# Patient Record
Sex: MALE | Race: WHITE | Employment: FULL TIME | URBAN - METROPOLITAN AREA
[De-identification: names, ages, dates, MRNs, and addresses within clinical notes are randomized per-mention and may not be internally consistent; named-entity substitution may affect disease eponyms.]

---

## 2020-07-30 ENCOUNTER — APPOINTMENT (OUTPATIENT)
Dept: CT IMAGING | Age: 45
End: 2020-07-30
Payer: COMMERCIAL

## 2020-07-30 ENCOUNTER — HOSPITAL ENCOUNTER (EMERGENCY)
Age: 45
Discharge: HOME | End: 2020-07-30
Payer: COMMERCIAL

## 2020-07-30 VITALS
TEMPERATURE: 98.8 F | BODY MASS INDEX: 34.06 KG/M2 | OXYGEN SATURATION: 99 % | RESPIRATION RATE: 16 BRPM | HEIGHT: 67 IN | DIASTOLIC BLOOD PRESSURE: 95 MMHG | WEIGHT: 217 LBS | SYSTOLIC BLOOD PRESSURE: 157 MMHG | HEART RATE: 86 BPM

## 2020-07-30 DIAGNOSIS — K57.32 DIVERTICULITIS OF COLON: Primary | ICD-10-CM

## 2020-07-30 LAB
ALBUMIN SERPL-MCNC: 4.3 G/DL (ref 3.5–5.2)
ALP BLD-CCNC: 80 U/L (ref 40–129)
ALT SERPL-CCNC: 49 U/L (ref 0–40)
ANION GAP SERPL CALCULATED.3IONS-SCNC: 11 MMOL/L (ref 7–16)
AST SERPL-CCNC: 23 U/L (ref 0–39)
BASOPHILS ABSOLUTE: 0.02 E9/L (ref 0–0.2)
BASOPHILS RELATIVE PERCENT: 0.2 % (ref 0–2)
BILIRUB SERPL-MCNC: 0.6 MG/DL (ref 0–1.2)
BUN BLDV-MCNC: 15 MG/DL (ref 6–20)
CALCIUM SERPL-MCNC: 9.3 MG/DL (ref 8.6–10.2)
CHLORIDE BLD-SCNC: 96 MMOL/L (ref 98–107)
CO2: 30 MMOL/L (ref 22–29)
CREAT SERPL-MCNC: 1.2 MG/DL (ref 0.7–1.2)
EOSINOPHILS ABSOLUTE: 0.23 E9/L (ref 0.05–0.5)
EOSINOPHILS RELATIVE PERCENT: 1.9 % (ref 0–6)
GFR AFRICAN AMERICAN: >60
GFR NON-AFRICAN AMERICAN: >60 ML/MIN/1.73
GLUCOSE BLD-MCNC: 87 MG/DL (ref 74–99)
HCT VFR BLD CALC: 46.2 % (ref 37–54)
HEMOGLOBIN: 16.5 G/DL (ref 12.5–16.5)
IMMATURE GRANULOCYTES #: 0.09 E9/L
IMMATURE GRANULOCYTES %: 0.7 % (ref 0–5)
LACTIC ACID: 1.9 MMOL/L (ref 0.5–2.2)
LIPASE: 22 U/L (ref 13–60)
LYMPHOCYTES ABSOLUTE: 1.57 E9/L (ref 1.5–4)
LYMPHOCYTES RELATIVE PERCENT: 12.7 % (ref 20–42)
MCH RBC QN AUTO: 29.9 PG (ref 26–35)
MCHC RBC AUTO-ENTMCNC: 35.7 % (ref 32–34.5)
MCV RBC AUTO: 83.7 FL (ref 80–99.9)
MONOCYTES ABSOLUTE: 1.3 E9/L (ref 0.1–0.95)
MONOCYTES RELATIVE PERCENT: 10.5 % (ref 2–12)
NEUTROPHILS ABSOLUTE: 9.14 E9/L (ref 1.8–7.3)
NEUTROPHILS RELATIVE PERCENT: 74 % (ref 43–80)
PDW BLD-RTO: 11.9 FL (ref 11.5–15)
PLATELET # BLD: 209 E9/L (ref 130–450)
PMV BLD AUTO: 9.5 FL (ref 7–12)
POTASSIUM SERPL-SCNC: 4 MMOL/L (ref 3.5–5)
RBC # BLD: 5.52 E12/L (ref 3.8–5.8)
SODIUM BLD-SCNC: 137 MMOL/L (ref 132–146)
TOTAL PROTEIN: 7.4 G/DL (ref 6.4–8.3)
WBC # BLD: 12.4 E9/L (ref 4.5–11.5)

## 2020-07-30 PROCEDURE — 96375 TX/PRO/DX INJ NEW DRUG ADDON: CPT

## 2020-07-30 PROCEDURE — 2500000003 HC RX 250 WO HCPCS: Performed by: PHYSICIAN ASSISTANT

## 2020-07-30 PROCEDURE — 80053 COMPREHEN METABOLIC PANEL: CPT

## 2020-07-30 PROCEDURE — 99284 EMERGENCY DEPT VISIT MOD MDM: CPT

## 2020-07-30 PROCEDURE — 85025 COMPLETE CBC W/AUTO DIFF WBC: CPT

## 2020-07-30 PROCEDURE — 2580000003 HC RX 258: Performed by: PHYSICIAN ASSISTANT

## 2020-07-30 PROCEDURE — 6360000004 HC RX CONTRAST MEDICATION: Performed by: RADIOLOGY

## 2020-07-30 PROCEDURE — 83605 ASSAY OF LACTIC ACID: CPT

## 2020-07-30 PROCEDURE — 6360000002 HC RX W HCPCS: Performed by: PHYSICIAN ASSISTANT

## 2020-07-30 PROCEDURE — 83690 ASSAY OF LIPASE: CPT

## 2020-07-30 PROCEDURE — 6370000000 HC RX 637 (ALT 250 FOR IP): Performed by: PHYSICIAN ASSISTANT

## 2020-07-30 PROCEDURE — 96365 THER/PROPH/DIAG IV INF INIT: CPT

## 2020-07-30 PROCEDURE — 74177 CT ABD & PELVIS W/CONTRAST: CPT

## 2020-07-30 RX ORDER — DICYCLOMINE HYDROCHLORIDE 10 MG/1
10 CAPSULE ORAL ONCE
Status: COMPLETED | OUTPATIENT
Start: 2020-07-30 | End: 2020-07-30

## 2020-07-30 RX ORDER — ENALAPRIL MALEATE 10 MG/1
10 TABLET ORAL 2 TIMES DAILY
COMMUNITY
End: 2021-09-13

## 2020-07-30 RX ORDER — METRONIDAZOLE 500 MG/1
500 TABLET ORAL 2 TIMES DAILY
Qty: 14 TABLET | Refills: 0 | Status: SHIPPED | OUTPATIENT
Start: 2020-07-30 | End: 2020-08-06

## 2020-07-30 RX ORDER — DIPHENHYDRAMINE HYDROCHLORIDE 50 MG/ML
25 INJECTION INTRAMUSCULAR; INTRAVENOUS ONCE
Status: COMPLETED | OUTPATIENT
Start: 2020-07-30 | End: 2020-07-30

## 2020-07-30 RX ORDER — HYDROMORPHONE HYDROCHLORIDE 1 MG/ML
0.5 INJECTION, SOLUTION INTRAMUSCULAR; INTRAVENOUS; SUBCUTANEOUS ONCE
Status: COMPLETED | OUTPATIENT
Start: 2020-07-30 | End: 2020-07-30

## 2020-07-30 RX ORDER — DIPHENHYDRAMINE HYDROCHLORIDE 50 MG/ML
25 INJECTION INTRAMUSCULAR; INTRAVENOUS ONCE
Status: DISCONTINUED | OUTPATIENT
Start: 2020-07-30 | End: 2020-07-30 | Stop reason: HOSPADM

## 2020-07-30 RX ORDER — CEFDINIR 300 MG/1
300 CAPSULE ORAL ONCE
Status: COMPLETED | OUTPATIENT
Start: 2020-07-30 | End: 2020-07-30

## 2020-07-30 RX ORDER — HYDROXYZINE HYDROCHLORIDE 25 MG/1
25 TABLET, FILM COATED ORAL 3 TIMES DAILY PRN
COMMUNITY

## 2020-07-30 RX ORDER — 0.9 % SODIUM CHLORIDE 0.9 %
1000 INTRAVENOUS SOLUTION INTRAVENOUS ONCE
Status: COMPLETED | OUTPATIENT
Start: 2020-07-30 | End: 2020-07-30

## 2020-07-30 RX ORDER — HYDROCHLOROTHIAZIDE 25 MG/1
25 TABLET ORAL DAILY
COMMUNITY

## 2020-07-30 RX ORDER — ONDANSETRON 4 MG/1
4 TABLET, ORALLY DISINTEGRATING ORAL EVERY 8 HOURS PRN
Qty: 24 TABLET | Refills: 0 | Status: SHIPPED | OUTPATIENT
Start: 2020-07-30 | End: 2021-08-25

## 2020-07-30 RX ORDER — KETOROLAC TROMETHAMINE 15 MG/ML
15 INJECTION, SOLUTION INTRAMUSCULAR; INTRAVENOUS ONCE
Status: COMPLETED | OUTPATIENT
Start: 2020-07-30 | End: 2020-07-30

## 2020-07-30 RX ORDER — CEFDINIR 300 MG/1
300 CAPSULE ORAL 2 TIMES DAILY
Qty: 14 CAPSULE | Refills: 0 | Status: SHIPPED | OUTPATIENT
Start: 2020-07-30 | End: 2020-08-06

## 2020-07-30 RX ORDER — DICYCLOMINE HYDROCHLORIDE 10 MG/1
20 CAPSULE ORAL
Qty: 15 CAPSULE | Refills: 0 | Status: SHIPPED | OUTPATIENT
Start: 2020-07-30 | End: 2021-08-25

## 2020-07-30 RX ADMIN — CEFDINIR 300 MG: 300 CAPSULE ORAL at 18:08

## 2020-07-30 RX ADMIN — DIPHENHYDRAMINE HYDROCHLORIDE 25 MG: 50 INJECTION, SOLUTION INTRAMUSCULAR; INTRAVENOUS at 16:10

## 2020-07-30 RX ADMIN — KETOROLAC TROMETHAMINE 15 MG: 15 INJECTION, SOLUTION INTRAMUSCULAR; INTRAVENOUS at 15:15

## 2020-07-30 RX ADMIN — IOPAMIDOL 75 ML: 755 INJECTION, SOLUTION INTRAVENOUS at 16:25

## 2020-07-30 RX ADMIN — DICYCLOMINE HYDROCHLORIDE 10 MG: 10 CAPSULE ORAL at 19:23

## 2020-07-30 RX ADMIN — SODIUM CHLORIDE 1000 ML: 9 INJECTION, SOLUTION INTRAVENOUS at 15:15

## 2020-07-30 RX ADMIN — IOHEXOL 50 ML: 240 INJECTION, SOLUTION INTRATHECAL; INTRAVASCULAR; INTRAVENOUS; ORAL at 16:25

## 2020-07-30 RX ADMIN — METRONIDAZOLE 500 MG: 500 INJECTION, SOLUTION INTRAVENOUS at 18:08

## 2020-07-30 RX ADMIN — HYDROMORPHONE HYDROCHLORIDE 0.5 MG: 1 INJECTION, SOLUTION INTRAMUSCULAR; INTRAVENOUS; SUBCUTANEOUS at 18:08

## 2020-07-30 RX ADMIN — HYDROMORPHONE HYDROCHLORIDE 0.5 MG: 1 INJECTION, SOLUTION INTRAMUSCULAR; INTRAVENOUS; SUBCUTANEOUS at 16:10

## 2020-07-30 SDOH — HEALTH STABILITY: MENTAL HEALTH: HOW OFTEN DO YOU HAVE A DRINK CONTAINING ALCOHOL?: NEVER

## 2020-07-30 ASSESSMENT — PAIN DESCRIPTION - PAIN TYPE: TYPE: ACUTE PAIN

## 2020-07-30 ASSESSMENT — PAIN SCALES - GENERAL
PAINLEVEL_OUTOF10: 6
PAINLEVEL_OUTOF10: 7
PAINLEVEL_OUTOF10: 6

## 2020-07-30 ASSESSMENT — PAIN DESCRIPTION - ORIENTATION: ORIENTATION: LEFT;LOWER

## 2020-07-30 ASSESSMENT — PAIN DESCRIPTION - LOCATION: LOCATION: ABDOMEN;FLANK

## 2020-07-30 NOTE — ED PROVIDER NOTES
[facility]     Department of Emergency Medicine   ED  Provider Note  Admit Date/RoomTime: 7/30/2020  2:50 PM  ED Room: 24/24  Chief Complaint     Abdominal Pain (LT SIDED FLANK AREA  X1 DAY/ HX DIVERTICULITIS)    History of Present Illness   Source of history provided by:  patient. History/Exam Limitations: none. Adarsh Hernandes is a 45 y.o. old male who has a past medical history of:   Past Medical History:   Diagnosis Date    Diverticulitis     Hypertension       presents to the emergency department by private vehicle, for complaints of gradual onset, still present, constant stabbing pain in the LLQ without radiation which began 1 day(s) prior to arrival.  There has been similar episodes in the past due to diverticulitis. Since onset the symptoms have been persistent and worsening. The pain is associated with nothing pertinent. The pain is aggravated by pressing on his abdomen and relieved by nothing. There has been NO back pain, chills, cloudy urine, constipation, diarrhea, dysuria, headache, hematuria, penile discharge, sweating, urinary frequency, urinary incontinence, urinary urgency or vomiting. ROS   Pertinent positives and negatives are stated within HPI, all other systems reviewed and are negative. Past Surgical History:   Procedure Laterality Date    COLON SURGERY       Social History:  reports that he has never smoked. He does not have any smokeless tobacco history on file. He reports that he does not drink alcohol or use drugs. Family History: family history is not on file. Allergies: Patient has no known allergies.     Physical Exam         ED Triage Vitals   BP Temp Temp Source Pulse Resp SpO2 Height Weight   07/30/20 1445 07/30/20 1440 07/30/20 1440 07/30/20 1445 07/30/20 1445 07/30/20 1445 07/30/20 1445 07/30/20 1445   128/89 98.8 °F (37.1 °C) Temporal 95 16 97 % 5' 7\" (1.702 m) 217 lb (98.4 kg)      Oxygen Saturation Interpretation: Normal.    · General Appearance/Constitutional:  Alert, development consistent with age. · HEENT:  NC/NT. Airway patent. · Neck:  Supple. No lymphadenopathy. · Respiratory: Lungs Clear to auscultation and breath sounds equal.  · CV:  Regular rate and rhythm. · GI:  General Appearance: normal.         Bowel sounds: normal bowel sounds. Distension:  Moderate. Tenderness: severe tenderness is present in the LLQ, no rebound tenderness, no guarding. Liver: non-tender. Spleen:  non-tender. Pulsatile Mass: absent. · Back: CVA Tenderness: No.  · Integument:  Normal turgor. Warm, dry, without visible rash, unless noted elsewhere. · Neurological:  Orientation age-appropriate. Motor functions intact.     Lab / Imaging Results   (All laboratory and radiology results have been personally reviewed by myself)  Labs:  Results for orders placed or performed during the hospital encounter of 07/30/20   Comprehensive Metabolic Panel   Result Value Ref Range    Sodium 137 132 - 146 mmol/L    Potassium 4.0 3.5 - 5.0 mmol/L    Chloride 96 (L) 98 - 107 mmol/L    CO2 30 (H) 22 - 29 mmol/L    Anion Gap 11 7 - 16 mmol/L    Glucose 87 74 - 99 mg/dL    BUN 15 6 - 20 mg/dL    CREATININE 1.2 0.7 - 1.2 mg/dL    GFR Non-African American >60 >=60 mL/min/1.73    GFR African American >60     Calcium 9.3 8.6 - 10.2 mg/dL    Total Protein 7.4 6.4 - 8.3 g/dL    Alb 4.3 3.5 - 5.2 g/dL    Total Bilirubin 0.6 0.0 - 1.2 mg/dL    Alkaline Phosphatase 80 40 - 129 U/L    ALT 49 (H) 0 - 40 U/L    AST 23 0 - 39 U/L   Lipase   Result Value Ref Range    Lipase 22 13 - 60 U/L   Lactic Acid, Plasma   Result Value Ref Range    Lactic Acid 1.9 0.5 - 2.2 mmol/L   CBC Auto Differential   Result Value Ref Range    WBC 12.4 (H) 4.5 - 11.5 E9/L    RBC 5.52 3.80 - 5.80 E12/L    Hemoglobin 16.5 12.5 - 16.5 g/dL    Hematocrit 46.2 37.0 - 54.0 %    MCV 83.7 80.0 - 99.9 fL    MCH 29.9 26.0 - 35.0 pg    MCHC 35.7 (H) 32.0 - 34.5 %    RDW 11.9 11.5 - 15.0 fL    Platelets 929 075 - 902 E9/L    MPV 9.5 7.0 - 12.0 fL    Neutrophils % 74.0 43.0 - 80.0 %    Immature Granulocytes % 0.7 0.0 - 5.0 %    Lymphocytes % 12.7 (L) 20.0 - 42.0 %    Monocytes % 10.5 2.0 - 12.0 %    Eosinophils % 1.9 0.0 - 6.0 %    Basophils % 0.2 0.0 - 2.0 %    Neutrophils Absolute 9.14 (H) 1.80 - 7.30 E9/L    Immature Granulocytes # 0.09 E9/L    Lymphocytes Absolute 1.57 1.50 - 4.00 E9/L    Monocytes Absolute 1.30 (H) 0.10 - 0.95 E9/L    Eosinophils Absolute 0.23 0.05 - 0.50 E9/L    Basophils Absolute 0.02 0.00 - 0.20 E9/L     Imaging: All Radiology results interpreted by Radiologist unless otherwise noted. CT ABDOMEN PELVIS W IV CONTRAST Additional Contrast? Oral   Preliminary Result   1. Acute uncomplicated diverticulitis involving the proximal sigmoid colon. No evidence of perforation or abscess. Previous sigmoid colectomy of the   distal sigmoid colon. The anastomosis is unremarkable. 2. No other acute findings within the abdomen or pelvis. 3. Mild hepatic steatosis. ED Course / Medical Decision Making     Medications   cefdinir (OMNICEF) capsule 300 mg (has no administration in time range)   metronidazole (FLAGYL) 500 mg in NaCl 100 mL IVPB premix (has no administration in time range)   0.9 % sodium chloride bolus (0 mLs Intravenous Stopped 7/30/20 1605)   ketorolac (TORADOL) injection 15 mg (15 mg Intravenous Given 7/30/20 1515)   HYDROmorphone HCl PF (DILAUDID) injection 0.5 mg (0.5 mg Intravenous Given 7/30/20 1610)   diphenhydrAMINE (BENADRYL) injection 25 mg (25 mg Intravenous Given 7/30/20 1610)   iopamidol (ISOVUE-370) 76 % injection 75 mL (75 mLs Intravenous Given 7/30/20 1625)   iohexol (OMNIPAQUE 240) injection 50 mL (50 mLs Oral Given 7/30/20 1625)        Re-Evaluations:  7/30/20   Time: 1600  Patient states that his symptoms were improving, but his pain is starting to worsen again.        Time: 1745    Patients symptoms are improving. Consultations:             None    Procedures:   none    MDM: Patient presents to the emergency department for left lower quadrant pain. Diagnostic work-up confirms uncomplicated diverticulitis. He had improvement of his symptoms with the medications above. He remained well-appearing throughout his emergency department stay. He will be given the prescriptions below and should follow-up with Lon Harris.  Specific conditions for emergent return have been discussed and the patient verbalized understanding to return immediately for new or worsening symptoms. Prior to discharge, patient was requesting 1 more dose of pain medication before he left. Patient states that his wife is going to pick him up and drive him home. Counseling:   I have spoken with the patient and discussed todays results, in addition to providing specific details for the plan of care and counseling regarding the diagnosis and prognosis and are agreeable with the plan. Assessment      1. Diverticulitis of colon      This patient's ED course included: a personal history and physicial examination, re-evaluation prior to disposition and IV medications  This patient has remained hemodynamically stable, improved and been closely monitored during their ED course. Plan   Discharge to home. Patient condition is good. New Medications     New Prescriptions    CEFDINIR (OMNICEF) 300 MG CAPSULE    Take 1 capsule by mouth 2 times daily for 7 days    DICYCLOMINE (BENTYL) 10 MG CAPSULE    Take 2 capsules by mouth 4 times daily (before meals and nightly)    METRONIDAZOLE (FLAGYL) 500 MG TABLET    Take 1 tablet by mouth 2 times daily for 7 days    ONDANSETRON (ZOFRAN ODT) 4 MG DISINTEGRATING TABLET    Take 1 tablet by mouth every 8 hours as needed for Nausea or Vomiting     Electronically signed by Misty Hale PA-C   DD: 7/30/20  **This report was transcribed using voice recognition software.  Every effort was made to ensure

## 2021-08-24 ENCOUNTER — APPOINTMENT (OUTPATIENT)
Dept: GENERAL RADIOLOGY | Age: 46
End: 2021-08-24
Payer: COMMERCIAL

## 2021-08-24 ENCOUNTER — APPOINTMENT (OUTPATIENT)
Dept: CT IMAGING | Age: 46
End: 2021-08-24
Payer: COMMERCIAL

## 2021-08-24 ENCOUNTER — HOSPITAL ENCOUNTER (EMERGENCY)
Age: 46
Discharge: ANOTHER HOSPITAL | End: 2021-08-24
Attending: EMERGENCY MEDICINE
Payer: COMMERCIAL

## 2021-08-24 ENCOUNTER — HOSPITAL ENCOUNTER (OUTPATIENT)
Age: 46
Setting detail: OBSERVATION
Discharge: HOME | End: 2021-08-26
Attending: EMERGENCY MEDICINE | Admitting: ORTHOPAEDIC SURGERY
Payer: COMMERCIAL

## 2021-08-24 VITALS
HEIGHT: 67 IN | TEMPERATURE: 97.7 F | HEART RATE: 91 BPM | RESPIRATION RATE: 20 BRPM | DIASTOLIC BLOOD PRESSURE: 73 MMHG | OXYGEN SATURATION: 98 % | WEIGHT: 237 LBS | SYSTOLIC BLOOD PRESSURE: 164 MMHG | BODY MASS INDEX: 37.2 KG/M2

## 2021-08-24 DIAGNOSIS — S93.324A LISFRANC'S DISLOCATION, RIGHT, INITIAL ENCOUNTER: Primary | ICD-10-CM

## 2021-08-24 DIAGNOSIS — S93.324A LISFRANC DISLOCATION, RIGHT, INITIAL ENCOUNTER: Primary | ICD-10-CM

## 2021-08-24 LAB
ANION GAP SERPL CALCULATED.3IONS-SCNC: 15 MMOL/L (ref 7–16)
BASOPHILS ABSOLUTE: 0.03 E9/L (ref 0–0.2)
BASOPHILS RELATIVE PERCENT: 0.3 % (ref 0–2)
BUN BLDV-MCNC: 16 MG/DL (ref 6–20)
CALCIUM SERPL-MCNC: 9.5 MG/DL (ref 8.6–10.2)
CHLORIDE BLD-SCNC: 98 MMOL/L (ref 98–107)
CO2: 26 MMOL/L (ref 22–29)
CREAT SERPL-MCNC: 1.1 MG/DL (ref 0.7–1.2)
EOSINOPHILS ABSOLUTE: 0.16 E9/L (ref 0.05–0.5)
EOSINOPHILS RELATIVE PERCENT: 1.6 % (ref 0–6)
GFR AFRICAN AMERICAN: >60
GFR NON-AFRICAN AMERICAN: >60 ML/MIN/1.73
GLUCOSE BLD-MCNC: 144 MG/DL (ref 74–99)
HCT VFR BLD CALC: 44.5 % (ref 37–54)
HEMOGLOBIN: 16.3 G/DL (ref 12.5–16.5)
IMMATURE GRANULOCYTES #: 0.04 E9/L
IMMATURE GRANULOCYTES %: 0.4 % (ref 0–5)
LYMPHOCYTES ABSOLUTE: 2.81 E9/L (ref 1.5–4)
LYMPHOCYTES RELATIVE PERCENT: 27.5 % (ref 20–42)
MAGNESIUM: 2.1 MG/DL (ref 1.6–2.6)
MCH RBC QN AUTO: 30.6 PG (ref 26–35)
MCHC RBC AUTO-ENTMCNC: 36.6 % (ref 32–34.5)
MCV RBC AUTO: 83.6 FL (ref 80–99.9)
MONOCYTES ABSOLUTE: 0.76 E9/L (ref 0.1–0.95)
MONOCYTES RELATIVE PERCENT: 7.5 % (ref 2–12)
NEUTROPHILS ABSOLUTE: 6.4 E9/L (ref 1.8–7.3)
NEUTROPHILS RELATIVE PERCENT: 62.7 % (ref 43–80)
PDW BLD-RTO: 12.4 FL (ref 11.5–15)
PLATELET # BLD: 265 E9/L (ref 130–450)
PMV BLD AUTO: 10 FL (ref 7–12)
POTASSIUM REFLEX MAGNESIUM: 3.4 MMOL/L (ref 3.5–5)
RBC # BLD: 5.32 E12/L (ref 3.8–5.8)
SODIUM BLD-SCNC: 139 MMOL/L (ref 132–146)
WBC # BLD: 10.2 E9/L (ref 4.5–11.5)

## 2021-08-24 PROCEDURE — 6360000002 HC RX W HCPCS: Performed by: STUDENT IN AN ORGANIZED HEALTH CARE EDUCATION/TRAINING PROGRAM

## 2021-08-24 PROCEDURE — 83735 ASSAY OF MAGNESIUM: CPT

## 2021-08-24 PROCEDURE — 85025 COMPLETE CBC W/AUTO DIFF WBC: CPT

## 2021-08-24 PROCEDURE — 73560 X-RAY EXAM OF KNEE 1 OR 2: CPT

## 2021-08-24 PROCEDURE — 73630 X-RAY EXAM OF FOOT: CPT

## 2021-08-24 PROCEDURE — 96376 TX/PRO/DX INJ SAME DRUG ADON: CPT

## 2021-08-24 PROCEDURE — 6360000002 HC RX W HCPCS: Performed by: PHYSICAL THERAPY ASSISTANT

## 2021-08-24 PROCEDURE — 96374 THER/PROPH/DIAG INJ IV PUSH: CPT

## 2021-08-24 PROCEDURE — 73700 CT LOWER EXTREMITY W/O DYE: CPT

## 2021-08-24 PROCEDURE — 36415 COLL VENOUS BLD VENIPUNCTURE: CPT

## 2021-08-24 PROCEDURE — 80048 BASIC METABOLIC PNL TOTAL CA: CPT

## 2021-08-24 PROCEDURE — 99284 EMERGENCY DEPT VISIT MOD MDM: CPT

## 2021-08-24 PROCEDURE — 96375 TX/PRO/DX INJ NEW DRUG ADDON: CPT

## 2021-08-24 PROCEDURE — 99283 EMERGENCY DEPT VISIT LOW MDM: CPT

## 2021-08-24 PROCEDURE — 6360000002 HC RX W HCPCS

## 2021-08-24 RX ORDER — HYDROMORPHONE HYDROCHLORIDE 1 MG/ML
1 INJECTION, SOLUTION INTRAMUSCULAR; INTRAVENOUS; SUBCUTANEOUS ONCE
Status: COMPLETED | OUTPATIENT
Start: 2021-08-24 | End: 2021-08-24

## 2021-08-24 RX ORDER — FENTANYL CITRATE 50 UG/ML
INJECTION, SOLUTION INTRAMUSCULAR; INTRAVENOUS
Status: COMPLETED
Start: 2021-08-24 | End: 2021-08-24

## 2021-08-24 RX ORDER — MIDAZOLAM HYDROCHLORIDE 2 MG/2ML
1 INJECTION, SOLUTION INTRAMUSCULAR; INTRAVENOUS ONCE
Status: DISCONTINUED | OUTPATIENT
Start: 2021-08-24 | End: 2021-08-24

## 2021-08-24 RX ORDER — HYDROMORPHONE HYDROCHLORIDE 1 MG/ML
0.5 INJECTION, SOLUTION INTRAMUSCULAR; INTRAVENOUS; SUBCUTANEOUS ONCE
Status: DISCONTINUED | OUTPATIENT
Start: 2021-08-24 | End: 2021-08-24

## 2021-08-24 RX ORDER — FENTANYL CITRATE 50 UG/ML
75 INJECTION, SOLUTION INTRAMUSCULAR; INTRAVENOUS ONCE
Status: COMPLETED | OUTPATIENT
Start: 2021-08-24 | End: 2021-08-24

## 2021-08-24 RX ORDER — FENTANYL CITRATE 50 UG/ML
50 INJECTION, SOLUTION INTRAMUSCULAR; INTRAVENOUS ONCE
Status: COMPLETED | OUTPATIENT
Start: 2021-08-24 | End: 2021-08-24

## 2021-08-24 RX ORDER — ONDANSETRON 2 MG/ML
4 INJECTION INTRAMUSCULAR; INTRAVENOUS EVERY 6 HOURS PRN
Status: DISCONTINUED | OUTPATIENT
Start: 2021-08-24 | End: 2021-08-26 | Stop reason: HOSPADM

## 2021-08-24 RX ADMIN — HYDROMORPHONE HYDROCHLORIDE 1 MG: 1 INJECTION, SOLUTION INTRAMUSCULAR; INTRAVENOUS; SUBCUTANEOUS at 22:21

## 2021-08-24 RX ADMIN — HYDROMORPHONE HYDROCHLORIDE 1 MG: 1 INJECTION, SOLUTION INTRAMUSCULAR; INTRAVENOUS; SUBCUTANEOUS at 18:43

## 2021-08-24 RX ADMIN — FENTANYL CITRATE 50 MCG: 50 INJECTION, SOLUTION INTRAMUSCULAR; INTRAVENOUS at 17:36

## 2021-08-24 RX ADMIN — HYDROMORPHONE HYDROCHLORIDE 1 MG: 1 INJECTION, SOLUTION INTRAMUSCULAR; INTRAVENOUS; SUBCUTANEOUS at 21:28

## 2021-08-24 RX ADMIN — FENTANYL CITRATE 100 MCG: 50 INJECTION, SOLUTION INTRAMUSCULAR; INTRAVENOUS at 23:52

## 2021-08-24 RX ADMIN — FENTANYL CITRATE 75 MCG: 50 INJECTION, SOLUTION INTRAMUSCULAR; INTRAVENOUS at 16:19

## 2021-08-24 RX ADMIN — HYDROMORPHONE HYDROCHLORIDE 1 MG: 1 INJECTION, SOLUTION INTRAMUSCULAR; INTRAVENOUS; SUBCUTANEOUS at 20:14

## 2021-08-24 ASSESSMENT — PAIN DESCRIPTION - PAIN TYPE
TYPE: ACUTE PAIN
TYPE: ACUTE PAIN

## 2021-08-24 ASSESSMENT — PAIN DESCRIPTION - FREQUENCY: FREQUENCY: CONTINUOUS

## 2021-08-24 ASSESSMENT — PAIN DESCRIPTION - LOCATION
LOCATION: FOOT
LOCATION: FOOT

## 2021-08-24 ASSESSMENT — PAIN SCALES - GENERAL
PAINLEVEL_OUTOF10: 10
PAINLEVEL_OUTOF10: 9
PAINLEVEL_OUTOF10: 10

## 2021-08-24 ASSESSMENT — PAIN DESCRIPTION - DESCRIPTORS: DESCRIPTORS: THROBBING

## 2021-08-24 ASSESSMENT — PAIN DESCRIPTION - ONSET: ONSET: ON-GOING

## 2021-08-24 ASSESSMENT — PAIN DESCRIPTION - ORIENTATION
ORIENTATION: RIGHT
ORIENTATION: RIGHT

## 2021-08-24 NOTE — ED PROVIDER NOTES
[address]      Pt Name: Nikhil Larkin  MRN: [id number]  [place] 1975  Date of evaluation: 8/24/2021      CHIEF COMPLAINT       Chief Complaint   Patient presents with    Fall     out of tree, around 20 feet, R foot deforimty         HPI  Nikhil Larkin is a 39 y.o. male who presents to the emergency department after falling from 19 to 20 feet out of a tree stand. Complaining of right foot pain and deformity. Denies any other injury or trauma. Describes pain to his foot is severe, constant, worse when he touches his foot, better at rest.  He denies any headache, loss of consciousness, chest pain, shortness breath, abdominal pain, knee pain, back pain. Denies any numbness tingling or weakness. No previous history of surgery on the foot. Except as noted above the remainder of the review of systems was reviewed and negative. Review of Systems   Constitutional: Negative for activity change, chills, diaphoresis, fatigue and fever. HENT: Negative for rhinorrhea, sore throat and trouble swallowing. Eyes: Negative for photophobia, pain and redness. Respiratory: Negative for apnea, cough, chest tightness, shortness of breath and wheezing. Cardiovascular: Negative for chest pain, palpitations and leg swelling. Gastrointestinal: Negative for abdominal pain, constipation, diarrhea, nausea and vomiting. Endocrine: Negative for polyuria. Genitourinary: Negative for difficulty urinating and dysuria. Musculoskeletal: Negative for back pain, neck pain and neck stiffness. Reports right foot pain   Skin: Negative for pallor and rash. Neurological: Negative for dizziness, syncope, weakness, light-headedness and numbness. Psychiatric/Behavioral: Negative for confusion. The patient is not nervous/anxious. Physical Exam  Vitals and nursing note reviewed.    Constitutional:       General: He is not in acute distress. Appearance: He is well-developed. Comments: Awake and alert. Sitting in the gurney in no obvious distress. HENT:      Head: Normocephalic and atraumatic. Mouth/Throat:      Mouth: Mucous membranes are moist.      Pharynx: No oropharyngeal exudate. Eyes:      General: No scleral icterus. Pupils: Pupils are equal, round, and reactive to light. Cardiovascular:      Rate and Rhythm: Normal rate and regular rhythm. Heart sounds: Normal heart sounds. No murmur heard. Comments: 2+ radial and dorsal pedis pulses bilaterally  Pulmonary:      Effort: Pulmonary effort is normal. No respiratory distress. Breath sounds: Normal breath sounds. No wheezing. Abdominal:      Palpations: Abdomen is soft. Tenderness: There is no abdominal tenderness. There is no guarding or rebound. Musculoskeletal:         General: Tenderness, deformity and signs of injury present. Normal range of motion. Cervical back: Normal range of motion and neck supple. Right lower leg: No edema. Left lower leg: No edema. Comments: Swelling and tenderness to the right lateral foot. Compartments soft. Skin:     General: Skin is warm and dry. Capillary Refill: Capillary refill takes less than 2 seconds. Findings: No rash. Neurological:      General: No focal deficit present. Mental Status: He is alert and oriented to person, place, and time. Cranial Nerves: No cranial nerve deficit. Sensory: No sensory deficit. Motor: No weakness or abnormal muscle tone. Psychiatric:         Mood and Affect: Mood normal.         Behavior: Behavior normal.          Procedures     MDM   This is a 72-year-old male who presents to the emergency department with right-sided foot pain after falling out of tree stand. In the emergency department the patient is awake and alert, hemodynamic stable, afebrile and in respiratory distress.   Swelling obvious deformity to the right foot. No other injury. No tenderness or injury to the ankle or knee. No back tenderness no midline tenderness. No other injuries noted. X-ray showed Lisfranc injury right foot with lateral displacement the base of second third and fourth digits. Discussed this with Dr. Ana Perez orthopedic surgery who recommended consulting trauma orthopedic surgeon at [facility] for further evaluation. Discussed with Dr. Apurva Childress  orthopedic surgery who recommended reduction and orthopedic evaluation. Patient transferred to [facility] for further orthopedic evaluation and possible surgical intervention that could not be done at this facility. Patient's pain managed with multiple doses of analgesic. Compartments remain soft. No additional injuries on repeat examination. Discussed plan for transfer and patient agreeable to plan. Discussed with Dr. Lundberg [facility] emergency medicine physician as he knows content accepts patient for ED to ED transfer. ED Course as of Aug 25 1244   Tue Aug 24, 2021   2005 Patient still having pain in the foot. Redosed Dilaudid. Still swelling but compartments soft. Good pulses distally. Normal perfusion. [LM]      ED Course User Index  [LM] Luciano Lopez DO         ED Course as of Aug 25 1305   Tue Aug 24, 2021   2005 Patient still having pain in the foot. Redosed Dilaudid. Still swelling but compartments soft. Good pulses distally. Normal perfusion. [LM]      ED Course User Index  [LM] Luciano Lopez DO       --------------------------------------------- PAST HISTORY ---------------------------------------------  Past Medical History:  has a past medical history of Diverticulitis and Hypertension. Past Surgical History:  has a past surgical history that includes Colon surgery. Social History:  reports that he has never smoked. He does not have any smokeless tobacco history on file.  He reports that he does not drink alcohol and does not use drugs. Family History: family history is not on file. The patients home medications have been reviewed. Allergies: Patient has no known allergies. -------------------------------------------------- RESULTS -------------------------------------------------    Lab  Results for orders placed or performed during the hospital encounter of 08/24/21   CBC Auto Differential   Result Value Ref Range    WBC 10.2 4.5 - 11.5 E9/L    RBC 5.32 3.80 - 5.80 E12/L    Hemoglobin 16.3 12.5 - 16.5 g/dL    Hematocrit 44.5 37.0 - 54.0 %    MCV 83.6 80.0 - 99.9 fL    MCH 30.6 26.0 - 35.0 pg    MCHC 36.6 (H) 32.0 - 34.5 %    RDW 12.4 11.5 - 15.0 fL    Platelets 145 100 - 688 E9/L    MPV 10.0 7.0 - 12.0 fL    Neutrophils % 62.7 43.0 - 80.0 %    Immature Granulocytes % 0.4 0.0 - 5.0 %    Lymphocytes % 27.5 20.0 - 42.0 %    Monocytes % 7.5 2.0 - 12.0 %    Eosinophils % 1.6 0.0 - 6.0 %    Basophils % 0.3 0.0 - 2.0 %    Neutrophils Absolute 6.40 1.80 - 7.30 E9/L    Immature Granulocytes # 0.04 E9/L    Lymphocytes Absolute 2.81 1.50 - 4.00 E9/L    Monocytes Absolute 0.76 0.10 - 0.95 E9/L    Eosinophils Absolute 0.16 0.05 - 0.50 E9/L    Basophils Absolute 0.03 0.00 - 0.20 V3/F   Basic Metabolic Panel w/ Reflex to MG   Result Value Ref Range    Sodium 139 132 - 146 mmol/L    Potassium reflex Magnesium 3.4 (L) 3.5 - 5.0 mmol/L    Chloride 98 98 - 107 mmol/L    CO2 26 22 - 29 mmol/L    Anion Gap 15 7 - 16 mmol/L    Glucose 144 (H) 74 - 99 mg/dL    BUN 16 6 - 20 mg/dL    CREATININE 1.1 0.7 - 1.2 mg/dL    GFR Non-African American >60 >=60 mL/min/1.73    GFR African American >60     Calcium 9.5 8.6 - 10.2 mg/dL   Magnesium   Result Value Ref Range    Magnesium 2.1 1.6 - 2.6 mg/dL       Radiology  CT FOOT RIGHT WO CONTRAST   Final Result   1. Superior and lateral displacement of bases of 2nd, 3rd, 4th, and 5th   metatarsal bones in relation to cuneiforms.    2. Fractures involving bases of 2nd, 3rd, 4th, and 5th metatarsal bone with   multiple adjacent fracture fragments. 3. Comminuted fracture involving medial cuneiform. XR FOOT RIGHT (MIN 3 VIEWS)   Final Result   Lisfranc injury of the right foot with lateral displacement of bases of 2nd,   3rd, and 4th digits with adjacent fracture fragments. CT could be helpful   for further evaluation. XR KNEE RIGHT (1-2 VIEWS)   Final Result   No fracture or dislocation. Joint spaces are intact.               ------------------------- NURSING NOTES AND VITALS REVIEWED ---------------------------  Date / Time Roomed:  8/24/2021  3:56 PM  ED Bed Assignment:  AMBER/AMBER    The nursing notes within the ED encounter and vital signs as below have been reviewed. Patient Vitals for the past 24 hrs:   BP Temp Temp src Pulse Resp SpO2 Height Weight   08/24/21 2109 (!) 164/73 -- -- 91 20 98 % -- --   08/24/21 1558 132/81 97.7 °F (36.5 °C) Oral -- -- -- -- --   08/24/21 1555 -- -- -- 100 24 99 % 5' 7\" (1.702 m) 237 lb (107.5 kg)       Oxygen Saturation Interpretation: Normal      ------------------------------------------ PROGRESS NOTES ------------------------------------------    I have spoken with the patient and discussed todays results, in addition to providing specific details for the plan of care and counseling regarding the diagnosis and prognosis. Their questions are answered at this time and they are agreeable with the plan. I have discussed the risks and benefits of transfer and they wish to proceed with the transfer. --------------------------------- ADDITIONAL PROVIDER NOTES ---------------------------------  Consultations:  Spoke with Dr. Mary Payne (Orthopedics). Discussed case. They will provide consultation. Spoke with Dr. Fay Veliz (EM). Discussed case. They accept patient for ed to ed transfer. Reason for transfer: need for higher level of care, need for orthopedic evaluation.      This patient's ED course included: a personal history and physicial examination, re-evaluation prior to disposition, multiple bedside re-evaluations and IV medications    This patient has remained hemodynamically stable during their ED course. Please note that the withdrawal or failure to initiate urgent interventions for this patient would likely result in a life threatening deterioration or permanent disability. Clinical Impression  1. Lisfranc's dislocation, right, initial encounter          Disposition  Patient's disposition: Transfer to [facility]. Transferred by: ground. Patient's condition is stable.       Nehamirta Mosquera DO  Resident  08/25/21 [id number]

## 2021-08-24 NOTE — ED NOTES
Bed: 05  Expected date:   Expected time:   Means of arrival:   Comments:     Kusum Barba RN  08/24/21 [id number]

## 2021-08-24 NOTE — ED NOTES
Patient right foot elevated, ice pack placed. Patient tolerated well.      Moisés Callahan RN  08/24/21 [id number]

## 2021-08-24 NOTE — ED NOTES
Bed: H5  Expected date:   Expected time:   Means of arrival:   Comments:  Pt in 1138 Osiel Luo RN  08/24/21 [id number]

## 2021-08-25 ENCOUNTER — ANESTHESIA (OUTPATIENT)
Dept: OPERATING ROOM | Age: 46
End: 2021-08-25
Payer: COMMERCIAL

## 2021-08-25 ENCOUNTER — APPOINTMENT (OUTPATIENT)
Dept: GENERAL RADIOLOGY | Age: 46
End: 2021-08-25
Payer: COMMERCIAL

## 2021-08-25 ENCOUNTER — ANESTHESIA EVENT (OUTPATIENT)
Dept: OPERATING ROOM | Age: 46
End: 2021-08-25
Payer: COMMERCIAL

## 2021-08-25 VITALS — DIASTOLIC BLOOD PRESSURE: 88 MMHG | SYSTOLIC BLOOD PRESSURE: 149 MMHG | OXYGEN SATURATION: 97 %

## 2021-08-25 PROBLEM — S93.324A LISFRANC DISLOCATION, RIGHT, INITIAL ENCOUNTER: Status: ACTIVE | Noted: 2021-08-25

## 2021-08-25 LAB
ABO/RH: NORMAL
ANTIBODY SCREEN: NORMAL
APTT: 27.5 SEC (ref 24.5–35.1)
INR BLD: 1.1
PROTHROMBIN TIME: 12.5 SEC (ref 9.3–12.4)

## 2021-08-25 PROCEDURE — 3600000015 HC SURGERY LEVEL 5 ADDTL 15MIN: Performed by: ORTHOPAEDIC SURGERY

## 2021-08-25 PROCEDURE — 96375 TX/PRO/DX INJ NEW DRUG ADDON: CPT

## 2021-08-25 PROCEDURE — 7100000001 HC PACU RECOVERY - ADDTL 15 MIN: Performed by: ORTHOPAEDIC SURGERY

## 2021-08-25 PROCEDURE — 6370000000 HC RX 637 (ALT 250 FOR IP): Performed by: STUDENT IN AN ORGANIZED HEALTH CARE EDUCATION/TRAINING PROGRAM

## 2021-08-25 PROCEDURE — G0378 HOSPITAL OBSERVATION PER HR: HCPCS

## 2021-08-25 PROCEDURE — 99220 PR INITIAL OBSERVATION CARE/DAY 70 MINUTES: CPT | Performed by: ORTHOPAEDIC SURGERY

## 2021-08-25 PROCEDURE — 2580000003 HC RX 258

## 2021-08-25 PROCEDURE — 73630 X-RAY EXAM OF FOOT: CPT

## 2021-08-25 PROCEDURE — 2580000003 HC RX 258: Performed by: PHYSICAL THERAPY ASSISTANT

## 2021-08-25 PROCEDURE — 6360000002 HC RX W HCPCS: Performed by: STUDENT IN AN ORGANIZED HEALTH CARE EDUCATION/TRAINING PROGRAM

## 2021-08-25 PROCEDURE — 6360000002 HC RX W HCPCS: Performed by: ORTHOPAEDIC SURGERY

## 2021-08-25 PROCEDURE — 6360000002 HC RX W HCPCS: Performed by: PHYSICAL THERAPY ASSISTANT

## 2021-08-25 PROCEDURE — 6360000002 HC RX W HCPCS

## 2021-08-25 PROCEDURE — 3700000001 HC ADD 15 MINUTES (ANESTHESIA): Performed by: ORTHOPAEDIC SURGERY

## 2021-08-25 PROCEDURE — 3209999900 FLUORO FOR SURGICAL PROCEDURES

## 2021-08-25 PROCEDURE — 2500000003 HC RX 250 WO HCPCS

## 2021-08-25 PROCEDURE — 28606 TREAT FOOT DISLOCATION: CPT | Performed by: ORTHOPAEDIC SURGERY

## 2021-08-25 PROCEDURE — 51798 US URINE CAPACITY MEASURE: CPT

## 2021-08-25 PROCEDURE — 2580000003 HC RX 258: Performed by: STUDENT IN AN ORGANIZED HEALTH CARE EDUCATION/TRAINING PROGRAM

## 2021-08-25 PROCEDURE — 6370000000 HC RX 637 (ALT 250 FOR IP): Performed by: UROLOGY

## 2021-08-25 PROCEDURE — 3600000005 HC SURGERY LEVEL 5 BASE: Performed by: ORTHOPAEDIC SURGERY

## 2021-08-25 PROCEDURE — 96376 TX/PRO/DX INJ SAME DRUG ADON: CPT

## 2021-08-25 PROCEDURE — 6370000000 HC RX 637 (ALT 250 FOR IP): Performed by: PHYSICAL THERAPY ASSISTANT

## 2021-08-25 PROCEDURE — 3700000000 HC ANESTHESIA ATTENDED CARE: Performed by: ORTHOPAEDIC SURGERY

## 2021-08-25 PROCEDURE — 86850 RBC ANTIBODY SCREEN: CPT

## 2021-08-25 PROCEDURE — 6370000000 HC RX 637 (ALT 250 FOR IP): Performed by: ANESTHESIOLOGY

## 2021-08-25 PROCEDURE — 85730 THROMBOPLASTIN TIME PARTIAL: CPT

## 2021-08-25 PROCEDURE — 86901 BLOOD TYPING SEROLOGIC RH(D): CPT

## 2021-08-25 PROCEDURE — 36415 COLL VENOUS BLD VENIPUNCTURE: CPT

## 2021-08-25 PROCEDURE — 2709999900 HC NON-CHARGEABLE SUPPLY: Performed by: ORTHOPAEDIC SURGERY

## 2021-08-25 PROCEDURE — 86900 BLOOD TYPING SEROLOGIC ABO: CPT

## 2021-08-25 PROCEDURE — 6360000002 HC RX W HCPCS: Performed by: ANESTHESIOLOGY

## 2021-08-25 PROCEDURE — 7100000000 HC PACU RECOVERY - FIRST 15 MIN: Performed by: ORTHOPAEDIC SURGERY

## 2021-08-25 PROCEDURE — 85610 PROTHROMBIN TIME: CPT

## 2021-08-25 DEVICE — K WIRE FIX L6IN DIA0.062IN 1600662] MICROAIRE SURGICAL INSTRUMENTS INC]: Type: IMPLANTABLE DEVICE | Site: ANKLE | Status: FUNCTIONAL

## 2021-08-25 RX ORDER — SODIUM CHLORIDE 0.9 % (FLUSH) 0.9 %
5-40 SYRINGE (ML) INJECTION PRN
Status: DISCONTINUED | OUTPATIENT
Start: 2021-08-25 | End: 2021-08-26 | Stop reason: HOSPADM

## 2021-08-25 RX ORDER — LIDOCAINE HYDROCHLORIDE 20 MG/ML
INJECTION, SOLUTION INTRAVENOUS PRN
Status: DISCONTINUED | OUTPATIENT
Start: 2021-08-25 | End: 2021-08-25 | Stop reason: SDUPTHER

## 2021-08-25 RX ORDER — ONDANSETRON 2 MG/ML
INJECTION INTRAMUSCULAR; INTRAVENOUS PRN
Status: DISCONTINUED | OUTPATIENT
Start: 2021-08-25 | End: 2021-08-25 | Stop reason: SDUPTHER

## 2021-08-25 RX ORDER — TAMSULOSIN HYDROCHLORIDE 0.4 MG/1
0.4 CAPSULE ORAL NIGHTLY
Status: DISCONTINUED | OUTPATIENT
Start: 2021-08-25 | End: 2021-08-26 | Stop reason: HOSPADM

## 2021-08-25 RX ORDER — PROPOFOL 10 MG/ML
INJECTION, EMULSION INTRAVENOUS PRN
Status: DISCONTINUED | OUTPATIENT
Start: 2021-08-25 | End: 2021-08-25 | Stop reason: SDUPTHER

## 2021-08-25 RX ORDER — GLYCOPYRROLATE 1 MG/5 ML
SYRINGE (ML) INTRAVENOUS PRN
Status: DISCONTINUED | OUTPATIENT
Start: 2021-08-25 | End: 2021-08-25 | Stop reason: SDUPTHER

## 2021-08-25 RX ORDER — SODIUM CHLORIDE 9 MG/ML
25 INJECTION, SOLUTION INTRAVENOUS PRN
Status: DISCONTINUED | OUTPATIENT
Start: 2021-08-25 | End: 2021-08-26 | Stop reason: HOSPADM

## 2021-08-25 RX ORDER — MORPHINE SULFATE 2 MG/ML
1 INJECTION, SOLUTION INTRAMUSCULAR; INTRAVENOUS EVERY 5 MIN PRN
Status: DISCONTINUED | OUTPATIENT
Start: 2021-08-25 | End: 2021-08-25 | Stop reason: HOSPADM

## 2021-08-25 RX ORDER — MIDAZOLAM HYDROCHLORIDE 1 MG/ML
INJECTION INTRAMUSCULAR; INTRAVENOUS PRN
Status: DISCONTINUED | OUTPATIENT
Start: 2021-08-25 | End: 2021-08-25 | Stop reason: SDUPTHER

## 2021-08-25 RX ORDER — ONDANSETRON 2 MG/ML
4 INJECTION INTRAMUSCULAR; INTRAVENOUS EVERY 6 HOURS PRN
Status: DISCONTINUED | OUTPATIENT
Start: 2021-08-25 | End: 2021-08-26 | Stop reason: HOSPADM

## 2021-08-25 RX ORDER — SODIUM CHLORIDE 0.9 % (FLUSH) 0.9 %
5-40 SYRINGE (ML) INJECTION EVERY 12 HOURS SCHEDULED
Status: DISCONTINUED | OUTPATIENT
Start: 2021-08-25 | End: 2021-08-26 | Stop reason: HOSPADM

## 2021-08-25 RX ORDER — TAMSULOSIN HYDROCHLORIDE 0.4 MG/1
0.4 CAPSULE ORAL NIGHTLY
Qty: 7 CAPSULE | Refills: 0 | Status: SHIPPED | OUTPATIENT
Start: 2021-08-25 | End: 2021-09-13

## 2021-08-25 RX ORDER — SODIUM CHLORIDE, SODIUM LACTATE, POTASSIUM CHLORIDE, CALCIUM CHLORIDE 600; 310; 30; 20 MG/100ML; MG/100ML; MG/100ML; MG/100ML
INJECTION, SOLUTION INTRAVENOUS CONTINUOUS
Status: DISCONTINUED | OUTPATIENT
Start: 2021-08-25 | End: 2021-08-26 | Stop reason: HOSPADM

## 2021-08-25 RX ORDER — DEXAMETHASONE SODIUM PHOSPHATE 10 MG/ML
INJECTION INTRAMUSCULAR; INTRAVENOUS PRN
Status: DISCONTINUED | OUTPATIENT
Start: 2021-08-25 | End: 2021-08-25 | Stop reason: SDUPTHER

## 2021-08-25 RX ORDER — OXYCODONE HYDROCHLORIDE AND ACETAMINOPHEN 5; 325 MG/1; MG/1
2 TABLET ORAL PRN
Status: DISCONTINUED | OUTPATIENT
Start: 2021-08-25 | End: 2021-08-25 | Stop reason: HOSPADM

## 2021-08-25 RX ORDER — ONDANSETRON 2 MG/ML
4 INJECTION INTRAMUSCULAR; INTRAVENOUS
Status: DISCONTINUED | OUTPATIENT
Start: 2021-08-25 | End: 2021-08-25 | Stop reason: HOSPADM

## 2021-08-25 RX ORDER — OXYCODONE HYDROCHLORIDE AND ACETAMINOPHEN 5; 325 MG/1; MG/1
1 TABLET ORAL EVERY 6 HOURS PRN
Qty: 28 TABLET | Refills: 0 | Status: SHIPPED | OUTPATIENT
Start: 2021-08-25 | End: 2021-09-01

## 2021-08-25 RX ORDER — SODIUM CHLORIDE, SODIUM LACTATE, POTASSIUM CHLORIDE, CALCIUM CHLORIDE 600; 310; 30; 20 MG/100ML; MG/100ML; MG/100ML; MG/100ML
INJECTION, SOLUTION INTRAVENOUS CONTINUOUS PRN
Status: DISCONTINUED | OUTPATIENT
Start: 2021-08-25 | End: 2021-08-25 | Stop reason: SDUPTHER

## 2021-08-25 RX ORDER — SODIUM CHLORIDE 9 MG/ML
25 INJECTION, SOLUTION INTRAVENOUS PRN
Status: DISCONTINUED | OUTPATIENT
Start: 2021-08-25 | End: 2021-08-25 | Stop reason: SDUPTHER

## 2021-08-25 RX ORDER — FENTANYL CITRATE 50 UG/ML
INJECTION, SOLUTION INTRAMUSCULAR; INTRAVENOUS PRN
Status: DISCONTINUED | OUTPATIENT
Start: 2021-08-25 | End: 2021-08-25 | Stop reason: SDUPTHER

## 2021-08-25 RX ORDER — LOSARTAN POTASSIUM 50 MG/1
50 TABLET ORAL DAILY
COMMUNITY

## 2021-08-25 RX ORDER — ROCURONIUM BROMIDE 10 MG/ML
INJECTION, SOLUTION INTRAVENOUS PRN
Status: DISCONTINUED | OUTPATIENT
Start: 2021-08-25 | End: 2021-08-25 | Stop reason: SDUPTHER

## 2021-08-25 RX ORDER — ONDANSETRON 4 MG/1
4 TABLET, ORALLY DISINTEGRATING ORAL EVERY 8 HOURS PRN
Status: DISCONTINUED | OUTPATIENT
Start: 2021-08-25 | End: 2021-08-26 | Stop reason: HOSPADM

## 2021-08-25 RX ORDER — MORPHINE SULFATE 4 MG/ML
4 INJECTION, SOLUTION INTRAMUSCULAR; INTRAVENOUS
Status: DISCONTINUED | OUTPATIENT
Start: 2021-08-25 | End: 2021-08-25

## 2021-08-25 RX ORDER — MEPERIDINE HYDROCHLORIDE 25 MG/ML
12.5 INJECTION INTRAMUSCULAR; INTRAVENOUS; SUBCUTANEOUS
Status: DISCONTINUED | OUTPATIENT
Start: 2021-08-25 | End: 2021-08-25 | Stop reason: HOSPADM

## 2021-08-25 RX ORDER — NEOSTIGMINE METHYLSULFATE 1 MG/ML
INJECTION, SOLUTION INTRAVENOUS PRN
Status: DISCONTINUED | OUTPATIENT
Start: 2021-08-25 | End: 2021-08-25 | Stop reason: SDUPTHER

## 2021-08-25 RX ORDER — ACETAMINOPHEN 500 MG
1000 TABLET ORAL ONCE
Status: COMPLETED | OUTPATIENT
Start: 2021-08-25 | End: 2021-08-25

## 2021-08-25 RX ORDER — OXYCODONE HYDROCHLORIDE 10 MG/1
10 TABLET ORAL EVERY 4 HOURS PRN
Status: DISCONTINUED | OUTPATIENT
Start: 2021-08-25 | End: 2021-08-26 | Stop reason: HOSPADM

## 2021-08-25 RX ORDER — MORPHINE SULFATE 2 MG/ML
2 INJECTION, SOLUTION INTRAMUSCULAR; INTRAVENOUS EVERY 5 MIN PRN
Status: DISCONTINUED | OUTPATIENT
Start: 2021-08-25 | End: 2021-08-25 | Stop reason: HOSPADM

## 2021-08-25 RX ORDER — OXYCODONE HYDROCHLORIDE AND ACETAMINOPHEN 5; 325 MG/1; MG/1
1 TABLET ORAL PRN
Status: DISCONTINUED | OUTPATIENT
Start: 2021-08-25 | End: 2021-08-25 | Stop reason: HOSPADM

## 2021-08-25 RX ORDER — SODIUM CHLORIDE 0.9 % (FLUSH) 0.9 %
5-40 SYRINGE (ML) INJECTION EVERY 12 HOURS SCHEDULED
Status: DISCONTINUED | OUTPATIENT
Start: 2021-08-25 | End: 2021-08-25 | Stop reason: SDUPTHER

## 2021-08-25 RX ORDER — ESCITALOPRAM OXALATE 10 MG/1
10 TABLET ORAL DAILY
COMMUNITY

## 2021-08-25 RX ORDER — MORPHINE SULFATE 2 MG/ML
2 INJECTION, SOLUTION INTRAMUSCULAR; INTRAVENOUS
Status: DISCONTINUED | OUTPATIENT
Start: 2021-08-25 | End: 2021-08-25

## 2021-08-25 RX ORDER — ACETAMINOPHEN 325 MG/1
650 TABLET ORAL EVERY 6 HOURS
Status: DISCONTINUED | OUTPATIENT
Start: 2021-08-25 | End: 2021-08-26 | Stop reason: HOSPADM

## 2021-08-25 RX ORDER — POLYETHYLENE GLYCOL 3350 17 G/17G
17 POWDER, FOR SOLUTION ORAL DAILY PRN
Status: DISCONTINUED | OUTPATIENT
Start: 2021-08-25 | End: 2021-08-26 | Stop reason: HOSPADM

## 2021-08-25 RX ORDER — OXYCODONE HYDROCHLORIDE 5 MG/1
5 TABLET ORAL EVERY 4 HOURS PRN
Status: DISCONTINUED | OUTPATIENT
Start: 2021-08-25 | End: 2021-08-26 | Stop reason: HOSPADM

## 2021-08-25 RX ORDER — SODIUM CHLORIDE 0.9 % (FLUSH) 0.9 %
5-40 SYRINGE (ML) INJECTION PRN
Status: DISCONTINUED | OUTPATIENT
Start: 2021-08-25 | End: 2021-08-25 | Stop reason: SDUPTHER

## 2021-08-25 RX ADMIN — LIDOCAINE HYDROCHLORIDE 100 MG: 20 INJECTION, SOLUTION INTRAVENOUS at 12:37

## 2021-08-25 RX ADMIN — Medication 0.6 MG: at 13:08

## 2021-08-25 RX ADMIN — ONDANSETRON HYDROCHLORIDE 4 MG: 2 INJECTION, SOLUTION INTRAMUSCULAR; INTRAVENOUS at 13:01

## 2021-08-25 RX ADMIN — MORPHINE SULFATE 4 MG: 4 INJECTION, SOLUTION INTRAMUSCULAR; INTRAVENOUS at 05:33

## 2021-08-25 RX ADMIN — HYDROMORPHONE HYDROCHLORIDE 0.5 MG: 1 INJECTION, SOLUTION INTRAMUSCULAR; INTRAVENOUS; SUBCUTANEOUS at 17:57

## 2021-08-25 RX ADMIN — SODIUM CHLORIDE, PRESERVATIVE FREE 10 ML: 5 INJECTION INTRAVENOUS at 21:01

## 2021-08-25 RX ADMIN — OXYCODONE HYDROCHLORIDE 10 MG: 10 TABLET ORAL at 08:35

## 2021-08-25 RX ADMIN — TAMSULOSIN HYDROCHLORIDE 0.4 MG: 0.4 CAPSULE ORAL at 21:00

## 2021-08-25 RX ADMIN — SODIUM CHLORIDE, POTASSIUM CHLORIDE, SODIUM LACTATE AND CALCIUM CHLORIDE: 600; 310; 30; 20 INJECTION, SOLUTION INTRAVENOUS at 13:08

## 2021-08-25 RX ADMIN — SODIUM CHLORIDE, POTASSIUM CHLORIDE, SODIUM LACTATE AND CALCIUM CHLORIDE: 600; 310; 30; 20 INJECTION, SOLUTION INTRAVENOUS at 12:13

## 2021-08-25 RX ADMIN — FENTANYL CITRATE 100 MCG: 50 INJECTION, SOLUTION INTRAMUSCULAR; INTRAVENOUS at 12:37

## 2021-08-25 RX ADMIN — MORPHINE SULFATE 4 MG: 4 INJECTION, SOLUTION INTRAMUSCULAR; INTRAVENOUS at 01:51

## 2021-08-25 RX ADMIN — SODIUM CHLORIDE, POTASSIUM CHLORIDE, SODIUM LACTATE AND CALCIUM CHLORIDE: 600; 310; 30; 20 INJECTION, SOLUTION INTRAVENOUS at 09:20

## 2021-08-25 RX ADMIN — ROCURONIUM BROMIDE 50 MG: 10 INJECTION, SOLUTION INTRAVENOUS at 12:37

## 2021-08-25 RX ADMIN — FENTANYL CITRATE 50 MCG: 50 INJECTION, SOLUTION INTRAMUSCULAR; INTRAVENOUS at 12:13

## 2021-08-25 RX ADMIN — Medication 2000 MG: at 22:19

## 2021-08-25 RX ADMIN — FENTANYL CITRATE 50 MCG: 50 INJECTION, SOLUTION INTRAMUSCULAR; INTRAVENOUS at 12:44

## 2021-08-25 RX ADMIN — HYDROMORPHONE HYDROCHLORIDE 0.5 MG: 1 INJECTION, SOLUTION INTRAMUSCULAR; INTRAVENOUS; SUBCUTANEOUS at 15:00

## 2021-08-25 RX ADMIN — SODIUM CHLORIDE, PRESERVATIVE FREE 10 ML: 5 INJECTION INTRAVENOUS at 17:58

## 2021-08-25 RX ADMIN — ACETAMINOPHEN 1000 MG: 500 TABLET ORAL at 12:07

## 2021-08-25 RX ADMIN — Medication 2000 MG: at 12:44

## 2021-08-25 RX ADMIN — Medication 3 MG: at 13:08

## 2021-08-25 RX ADMIN — MIDAZOLAM 2 MG: 1 INJECTION INTRAMUSCULAR; INTRAVENOUS at 12:32

## 2021-08-25 RX ADMIN — HYDROMORPHONE HYDROCHLORIDE 0.5 MG: 1 INJECTION, SOLUTION INTRAMUSCULAR; INTRAVENOUS; SUBCUTANEOUS at 09:21

## 2021-08-25 RX ADMIN — DEXAMETHASONE SODIUM PHOSPHATE 10 MG: 10 INJECTION INTRAMUSCULAR; INTRAVENOUS at 12:44

## 2021-08-25 RX ADMIN — PROPOFOL 200 MG: 10 INJECTION, EMULSION INTRAVENOUS at 12:37

## 2021-08-25 RX ADMIN — OXYCODONE HYDROCHLORIDE 10 MG: 10 TABLET ORAL at 20:59

## 2021-08-25 ASSESSMENT — PULMONARY FUNCTION TESTS
PIF_VALUE: 1
PIF_VALUE: 1
PIF_VALUE: 0
PIF_VALUE: 0
PIF_VALUE: 26
PIF_VALUE: 0
PIF_VALUE: 27
PIF_VALUE: 19
PIF_VALUE: 2
PIF_VALUE: 0
PIF_VALUE: 27
PIF_VALUE: 1
PIF_VALUE: 0
PIF_VALUE: 0
PIF_VALUE: 1
PIF_VALUE: 25
PIF_VALUE: 2
PIF_VALUE: 24
PIF_VALUE: 0
PIF_VALUE: 27
PIF_VALUE: 1
PIF_VALUE: 26
PIF_VALUE: 0
PIF_VALUE: 29
PIF_VALUE: 27
PIF_VALUE: 0
PIF_VALUE: 2
PIF_VALUE: 20
PIF_VALUE: 0
PIF_VALUE: 1
PIF_VALUE: 42
PIF_VALUE: 27
PIF_VALUE: 19
PIF_VALUE: 15
PIF_VALUE: 24
PIF_VALUE: 27
PIF_VALUE: 4
PIF_VALUE: 0
PIF_VALUE: 23
PIF_VALUE: 27
PIF_VALUE: 24
PIF_VALUE: 29
PIF_VALUE: 2
PIF_VALUE: 27
PIF_VALUE: 1
PIF_VALUE: 0
PIF_VALUE: 24
PIF_VALUE: 24
PIF_VALUE: 0
PIF_VALUE: 20
PIF_VALUE: 2
PIF_VALUE: 0
PIF_VALUE: 27
PIF_VALUE: 26
PIF_VALUE: 26
PIF_VALUE: 0
PIF_VALUE: 21
PIF_VALUE: 0
PIF_VALUE: 0
PIF_VALUE: 26
PIF_VALUE: 0
PIF_VALUE: 0
PIF_VALUE: 1
PIF_VALUE: 29
PIF_VALUE: 21
PIF_VALUE: 24
PIF_VALUE: 27
PIF_VALUE: 25
PIF_VALUE: 0
PIF_VALUE: 25
PIF_VALUE: 21
PIF_VALUE: 25
PIF_VALUE: 0
PIF_VALUE: 25
PIF_VALUE: 25
PIF_VALUE: 0
PIF_VALUE: 3
PIF_VALUE: 24
PIF_VALUE: 26
PIF_VALUE: 25
PIF_VALUE: 24
PIF_VALUE: 26
PIF_VALUE: 4
PIF_VALUE: 25

## 2021-08-25 ASSESSMENT — PAIN DESCRIPTION - ORIENTATION
ORIENTATION: RIGHT

## 2021-08-25 ASSESSMENT — ENCOUNTER SYMPTOMS
APNEA: 0
NAUSEA: 0
CHEST TIGHTNESS: 0
EYE REDNESS: 0
DIARRHEA: 0
EYE PAIN: 0
SHORTNESS OF BREATH: 0
VOMITING: 0
ABDOMINAL PAIN: 0
COUGH: 0
VOMITING: 0
SHORTNESS OF BREATH: 0
BACK PAIN: 0
EYE REDNESS: 0
NAUSEA: 0
CONSTIPATION: 0
RHINORRHEA: 0
TROUBLE SWALLOWING: 0
PHOTOPHOBIA: 0
ABDOMINAL PAIN: 0
SORE THROAT: 0
WHEEZING: 0

## 2021-08-25 ASSESSMENT — PAIN DESCRIPTION - PAIN TYPE
TYPE: ACUTE PAIN
TYPE: SURGICAL PAIN
TYPE: ACUTE PAIN

## 2021-08-25 ASSESSMENT — PAIN SCALES - GENERAL
PAINLEVEL_OUTOF10: 0
PAINLEVEL_OUTOF10: 7
PAINLEVEL_OUTOF10: 0
PAINLEVEL_OUTOF10: 0
PAINLEVEL_OUTOF10: 10
PAINLEVEL_OUTOF10: 0
PAINLEVEL_OUTOF10: 9
PAINLEVEL_OUTOF10: 0
PAINLEVEL_OUTOF10: 7
PAINLEVEL_OUTOF10: 10
PAINLEVEL_OUTOF10: 4
PAINLEVEL_OUTOF10: 10
PAINLEVEL_OUTOF10: 10

## 2021-08-25 ASSESSMENT — PAIN DESCRIPTION - ONSET
ONSET: GRADUAL
ONSET: ON-GOING
ONSET: ON-GOING

## 2021-08-25 ASSESSMENT — PAIN DESCRIPTION - PROGRESSION
CLINICAL_PROGRESSION: GRADUALLY WORSENING
CLINICAL_PROGRESSION: NOT CHANGED
CLINICAL_PROGRESSION: GRADUALLY WORSENING

## 2021-08-25 ASSESSMENT — PAIN DESCRIPTION - FREQUENCY
FREQUENCY: CONTINUOUS

## 2021-08-25 ASSESSMENT — PAIN DESCRIPTION - DESCRIPTORS
DESCRIPTORS: THROBBING;SHARP
DESCRIPTORS: TENDER
DESCRIPTORS: THROBBING;SHOOTING

## 2021-08-25 ASSESSMENT — PAIN - FUNCTIONAL ASSESSMENT
PAIN_FUNCTIONAL_ASSESSMENT: PREVENTS OR INTERFERES SOME ACTIVE ACTIVITIES AND ADLS
PAIN_FUNCTIONAL_ASSESSMENT: PREVENTS OR INTERFERES SOME ACTIVE ACTIVITIES AND ADLS

## 2021-08-25 ASSESSMENT — PAIN DESCRIPTION - LOCATION
LOCATION: FOOT
LOCATION: FOOT;LEG
LOCATION: FOOT;LEG

## 2021-08-25 ASSESSMENT — LIFESTYLE VARIABLES: SMOKING_STATUS: 0

## 2021-08-25 NOTE — ED NOTES
Perfect serve message sent to ortho resident Dr. Lynsey Sosa to notify physician that patient took his splint off because he said it hurt and was tight and that patient states morphine isn't working and he wants Dilaudid. Waiting for response from physician.       Caesar Castellon RN  08/25/21 [id number]

## 2021-08-25 NOTE — ED NOTES
Ortho resident at bedside to apply splint, patient medicated per order.       Clint Purdy RN  08/24/21 [id number]

## 2021-08-25 NOTE — ED NOTES
This patient talking to this RN through curtain while this RN with other patient. Patient stating that morphine didn't work and he wants Dilaudid because that's what he got before and it worked better. Informed patient that I was with another patient and will be with him as soon as possible. This RN entered this patient's room moments later and observed patient lying on cart with splint partially removed, some padding on cart, some padding on bedside table. patient admits to taking it off, patient states it was too tight and hurt.        Sabiha Martins RN  08/25/21 [id number]

## 2021-08-25 NOTE — BRIEF OP NOTE
Brief Postoperative Note      Patient: Lamonte Fenton  YOB: 1975  MRN: [id number]    Date of Procedure: 8/25/2021    Pre-Op Diagnosis: Right foot lisfranc injury    Post-Op Diagnosis: Same       Procedure(s):  RIGHT FOOT LISFRANC  PERCUTANEOUS PINNING    Surgeon(s):  Budd Denver, DO    Assistant:  Resident: Milagros Velasquez DO    Anesthesia: General    Estimated Blood Loss (mL): Minimal    Complications: None    Specimens:   * No specimens in log *    Implants:  Implant Name Type Inv.  Item Serial No.  Lot No. LRB No. Used Action   K WIRE FIX L6IN DIA0.062IN [id number] [facility] [employer]]  K W[employer] FIX L6IN DIA0.062IN [id number] [facility] [employer]]  [facility] [employer] [id number] Right 3 Implanted         Drains: * No LDAs found *    Findings: left ankle lisfranc injury     Electronically signed by Milagros Velasquez DO on 8/25/2021 at 1:14 PM

## 2021-08-25 NOTE — ED PROVIDER NOTES
Chief complaint: Foot pain      HPI:  8/25/21, Time: 12:42 AM EDT    HPI               Prieto Martins is a 39 y.o. male presenting to the ED for foot pain. The history is obtained from the patient as well as patient's medical record. The patient is presenting to the emergency department the chief complaint of right foot pain. Patient states that prior to his arrival at [facility] emergency department he fell approximately 20 feet from a tree. The patient did have severe right foot pain. The patient did have imaging which revealed multiple fractures in his right foot. Orthopedics were consulted and recommended transfer to our facility. Patient states pain is located mostly in his right foot described a sharp, nonradiating. Nothing makes it better. Worse with palpation and attempting to bear weight. The patient was given IV pain medication at [facility] emergency department. ROS:   Review of Systems   Constitutional: Negative for chills and fever. HENT: Negative for congestion. Eyes: Negative for redness. Respiratory: Negative for shortness of breath. Cardiovascular: Negative for chest pain. Gastrointestinal: Negative for abdominal pain, nausea and vomiting. Genitourinary: Negative for dysuria. Musculoskeletal: Positive for arthralgias and myalgias. Skin: Negative for rash. Neurological: Negative for light-headedness. Psychiatric/Behavioral: Negative for confusion. All other systems reviewed and are negative.      --------------------------------------------- PAST HISTORY ---------------------------------------------  Past Medical History:  has a past medical history of Diverticulitis and Hypertension. Past Surgical History:  has a past surgical history that includes Colon surgery. Social History:  reports that he has never smoked. He does not have any smokeless tobacco history on file. He reports that he does not drink alcohol and does not use drugs.     Family History: family history is not on file. The patients home medications have been reviewed. Allergies: Patient has no known allergies. ---------------------------------------------------PHYSICAL EXAM--------------------------------------  Constitutional/General: Alert and oriented x3, well appearing, non toxic in NAD  Head: Normocephalic and atraumatic  Mouth: Oropharynx clear, handling secretions, no trismus  Neck: Supple, full ROM,  Pulmonary: Lungs clear to auscultation bilaterally, no wheezes, rales, or rhonchi. Not in respiratory distress  Cardiovascular:  Regular rate. Regular rhythm. No murmurs  Chest: no chest wall tenderness  Abdomen: Soft. Non tender. Non distended. No rebound, guarding, or rigidity. No pulsatile masses appreciated. Musculoskeletal: Moves all extremities x 4. Warm and well perfused, no clubbing, cyanosis, or edema. Capillary refill <3 seconds, 2+ dorsalis pedis and posterior tibial present on the right foot. Diffuse tenderness to palpation on the right foot. Skin: warm and dry. No rashes. Neurologic: GCS 15, no gross focal neurologic deficits  Psych: Normal Affect    -------------------------------------------------- RESULTS -------------------------------------------------  I have personally reviewed all laboratory and imaging results for this patient. Results are listed below. LABS:  Results for orders placed or performed during the hospital encounter of 08/24/21   Protime-INR   Result Value Ref Range    Protime 12.5 (H) 9.3 - 12.4 sec    INR 1.1    APTT   Result Value Ref Range    aPTT 27.5 24.5 - 35.1 sec   TYPE AND SCREEN   Result Value Ref Range    ABO/Rh O POS     Antibody Screen NEG        RADIOLOGY:  Interpreted by Radiologist.  XR FOOT RIGHT (MIN 3 VIEWS)   Final Result   Right foot Lisfranc fracture dislocation status post closed reduction and   percutaneous K-wire pinning. Previously seen midfoot dislocations show   successful reduction. No complication seen. FLUORO FOR SURGICAL PROCEDURES   Final Result   Intraprocedural fluoroscopic spot images as above. See separate procedure   report for more information. XR FOOT RIGHT (MIN 3 VIEWS)   Final Result   Lisfranc injury. No significant interval reduction. Note that the 1st   metatarsal also now appears dislocated which was not evident on the prior. ------------------------- NURSING NOTES AND VITALS REVIEWED ---------------------------   The nursing notes within the ED encounter and vital signs as below have been reviewed by myself. BP (!) 150/85   Pulse 85   Temp 97.2 °F (36.2 °C) (Temporal)   Resp 18   Ht 5' 8\" (1.727 m)   Wt 237 lb (107.5 kg)   SpO2 94%   BMI 36.04 kg/m²   Oxygen Saturation Interpretation: Normal    The patients available past medical records and past encounters were reviewed.         ------------------------------ ED COURSE/MEDICAL DECISION MAKING----------------------  Medications   ondansetron (ZOFRAN) injection 4 mg (has no administration in time range)   ondansetron (ZOFRAN-ODT) disintegrating tablet 4 mg (has no administration in time range)     Or   ondansetron (ZOFRAN) injection 4 mg (has no administration in time range)   polyethylene glycol (GLYCOLAX) packet 17 g (has no administration in time range)   lactated ringers infusion ( IntraVENous New Bag 8/25/21 0920)   acetaminophen (TYLENOL) tablet 650 mg (650 mg Oral Not Given 8/25/21 0901)   oxyCODONE (ROXICODONE) immediate release tablet 5 mg (has no administration in time range)     Or   oxyCODONE HCl (OXY-IR) immediate release tablet 10 mg (has no administration in time range)   bisacodyl (DULCOLAX) EC tablet 5 mg (5 mg Oral Not Given 8/25/21 0924)   HYDROmorphone (DILAUDID) injection 0.5 mg (0.5 mg IntraVENous Given 8/25/21 0921)   oxyCODONE (ROXICODONE) immediate release tablet 5 mg ( Oral See Alternative 8/25/21 0881)     Or   oxyCODONE HCl (OXY-IR) immediate release tablet 10 mg (10 mg Oral Given 8/25/21 0835)   sodium chloride flush 0.9 % injection 5-40 mL (has no administration in time range)   sodium chloride flush 0.9 % injection 5-40 mL (has no administration in time range)   0.9 % sodium chloride infusion (has no administration in time range)   ceFAZolin (ANCEF) 2000 mg in sterile water 20 mL IV syringe (has no administration in time range)   fentaNYL (SUBLIMAZE) injection 100 mcg (100 mcg IntraVENous Given 8/24/21 6482)   ceFAZolin (ANCEF) 2000 mg in sterile water 20 mL IV syringe (2,000 mg IntraVENous Given 8/25/21 1244)   acetaminophen (TYLENOL) tablet 1,000 mg (1,000 mg Oral Given 8/25/21 1207)             Medical Decision Making:   I, Dr. Emily Gavin am the primary physician of record. Ness Davis is a 39 y.o. male who presents to the ED for foot pain. The patient was transferred to our facility for Lisfranc injury. Orthopedic consultation. Patient will be admitted. Re-Evaluations/Consultations: The patient is resting comfortably in the bed in no acute distress               This patient's ED course included: History, physical examination, reevaluation prior to disposition, orthopedic consultation    This patient has remained hemodynamically stable during their ED course. Counseling: The emergency provider has spoken with the patient and discussed todays results, in addition to providing specific details for the plan of care and counseling regarding the diagnosis and prognosis. Questions are answered at this time and they are agreeable with the plan.       --------------------------------- IMPRESSION AND DISPOSITION ---------------------------------    IMPRESSION  1. Lisfranc dislocation, right, initial encounter        DISPOSITION  Disposition: Admit to med/surg floor  Patient condition is stable        NOTE: This report was transcribed using voice recognition software.  Every effort was made to ensure accuracy; however, inadvertent computerized transcription errors may be present         Scott Weldon, DO  08/25/21 1552

## 2021-08-25 NOTE — ANESTHESIA PRE PROCEDURE
Department of Anesthesiology  Preprocedure Note       Name:  Romaine Ochoa   Age:  39 y.o.  :  1975                                          MRN:  [id number]         Date:  2021      Surgeon: Bowen Huber):  Aliyah Juarez MD    Procedure: Procedure(s):  RIGHT FOOT PERCUTANEOUS PINNING VS  OPEN REDUCTION INTERNAL FIXATION    Medications prior to admission:   Prior to Admission medications    Medication Sig Start Date End Date Taking?  Authorizing Provider   escitalopram (LEXAPRO) 10 MG tablet Take 10 mg by mouth daily   Yes Historical Provider, MD   losartan (COZAAR) 50 MG tablet Take 50 mg by mouth daily   Yes Historical Provider, MD   enalapril (VASOTEC) 10 MG tablet Take 10 mg by mouth 2 times daily    Yes Historical Provider, MD   hydroCHLOROthiazide (HYDRODIURIL) 25 MG tablet Take 25 mg by mouth daily   Yes Historical Provider, MD   hydrOXYzine (ATARAX) 25 MG tablet Take 25 mg by mouth 3 times daily as needed for Anxiety    Yes Historical Provider, MD       Current medications:    Current Facility-Administered Medications   Medication Dose Route Frequency Provider Last Rate Last Admin    sodium chloride flush 0.9 % injection 5-40 mL  5-40 mL IntraVENous 2 times per day [place] L Indira, DO        sodium chloride flush 0.9 % injection 5-40 mL  5-40 mL IntraVENous PRN [place] L Indira, DO        0.9 % sodium chloride infusion  25 mL IntraVENous PRN [place] L Indira, DO        ondansetron (ZOFRAN-ODT) disintegrating tablet 4 mg  4 mg Oral Q8H PRN [place] L Indira, DO        Or    ondansetron (ZOFRAN) injection 4 mg  4 mg IntraVENous Q6H PRN [place] L Indira, DO        polyethylene glycol (GLYCOLAX) packet 17 g  17 g Oral Daily PRN [place] L Indira, DO        lactated ringers infusion   IntraVENous Continuous [place] L Indira,  mL/hr at 21 0920 New Bag at 21 0920    acetaminophen (TYLENOL) tablet 650 mg  650 mg Oral Q6H [place] L Indira, DO        oxyCODONE (ROXICODONE) immediate release tablet 5 mg  5 mg Oral Q4H PRN Carlitos Chew FELISA Indira, DO        Or    oxyCODONE HCl (OXY-IR) immediate release tablet 10 mg  10 mg Oral Q4H PRN Carlitos ROBERTO Indira, DO        bisacodyl (DULCOLAX) EC tablet 5 mg  5 mg Oral Daily Carlitos Chew FELISA Indira, DO        HYDROmorphone (DILAUDID) injection 0.5 mg  0.5 mg IntraVENous Q4H PRN Carlitos ROBERTO Indira, DO   0.5 mg at 08/25/21 1705    oxyCODONE (ROXICODONE) immediate release tablet 5 mg  5 mg Oral Q4H PRN Carlitos ROBERTO Indira, DO        Or    oxyCODONE HCl (OXY-IR) immediate release tablet 10 mg  10 mg Oral Q4H PRN Carlitos ROBERTO Indira, DO   10 mg at 08/25/21 6033    ceFAZolin (ANCEF) 2000 mg in sterile water 20 mL IV syringe  2,000 mg IntraVENous On Call to [address], DO        on[facility]etron [facility]) injection 4 mg  4 mg IntraVENous Q6H PRN Maryannexavier De La Vega DO         Current Outpatient Medications   Medication Sig Dispense Refill    escitalopram (LEXAPRO) 10 MG tablet Take 10 mg by mouth daily      losartan (COZAAR) 50 MG tablet Take 50 mg by mouth daily      enalapril (VASOTEC) 10 MG tablet Take 10 mg by mouth 2 times daily       hydroCHLOROthiazide (HYDRODIURIL) 25 MG tablet Take 25 mg by mouth daily      hydrOXYzine (ATARAX) 25 MG tablet Take 25 mg by mouth 3 times daily as needed for Anxiety          Allergies:  No Known Allergies    Problem List:    Patient Active Problem List   Diagnosis Code    Lisfranc dislocation, right, initial encounter B10.689P       Past Medical History:        Diagnosis Date    Diverticulitis     Hypertension        Past Surgical History:        Procedure Laterality Date    COLON SURGERY         Social History:    Social History     Tobacco Use    Smoking status: Never Smoker   Substance Use Topics    Alcohol use: Never                                Counseling given: Not Answered      Vital Signs (Current):   Vitals:    08/25/21 0148 08/25/21 0532 08/25/21 0815 08/25/21 0835   BP: (!) 155/107 (!) 147/77 138/86    Pulse: 105 92 92 Resp: 20 18 20    Temp: 36.2 °C (97.1 °F) 36.2 °C (97.1 °F) 36.7 °C (98 °F)    TempSrc: Infrared Infrared Infrared    SpO2: 96% 95%  97%   Weight:       Height:                                                  BP Readings from Last 3 Encounters:   08/25/21 138/86   08/24/21 (!) 164/73   07/30/20 (!) 157/95       NPO Status:    Last solid and liquid consumption at 0900 on 8/24/21                                                                             BMI:   Wt Readings from Last 3 Encounters:   08/24/21 237 lb (107.5 kg)   08/24/21 237 lb (107.5 kg)   07/30/20 217 lb (98.4 kg)     Body mass index is 36.04 kg/m². CBC:   Lab Results   Component Value Date    WBC 10.2 08/24/2021    RBC 5.32 08/24/2021    HGB 16.3 08/24/2021    HCT 44.5 08/24/2021    MCV 83.6 08/24/2021    RDW 12.4 08/24/2021     08/24/2021       CMP:   Lab Results   Component Value Date     08/24/2021    K 3.4 08/24/2021    CL 98 08/24/2021    CO2 26 08/24/2021    BUN 16 08/24/2021    CREATININE 1.1 08/24/2021    GFRAA >60 08/24/2021    LABGLOM >60 08/24/2021    GLUCOSE 144 08/24/2021    PROT 7.4 07/30/2020    CALCIUM 9.5 08/24/2021    BILITOT 0.6 07/30/2020    ALKPHOS 80 07/30/2020    AST 23 07/30/2020    ALT 49 07/30/2020       POC Tests: No results for input(s): POCGLU, POCNA, POCK, POCCL, POCBUN, POCHEMO, POCHCT in the last 72 hours.     Coags:   Lab Results   Component Value Date    PROTIME 12.5 08/25/2021    INR 1.1 08/25/2021    APTT 27.5 08/25/2021       HCG (If Applicable): No results found for: PREGTESTUR, PREGSERUM, HCG, HCGQUANT     ABGs: No results found for: PHART, PO2ART, NDG9XYT, WNQ4OZB, BEART, G5GKFCVO     Type & Screen (If Applicable):  No results found for: LABABO, LABRH    Drug/Infectious Status (If Applicable):  No results found for: HIV, HEPCAB    COVID-19 Screening (If Applicable): No results found for: COVID19        Anesthesia Evaluation  Patient summary reviewed and Nursing notes reviewed no history of anesthetic complications:   Airway: Mallampati: III  TM distance: <3 FB   Neck ROM: full  Mouth opening: > = 3 FB Dental:      Comment: Multiple chipped teeth. Denies loose teeth. Pulmonary:Negative Pulmonary ROS breath sounds clear to auscultation      (-) not a current smoker          Patient did not smoke on day of surgery. Cardiovascular:  Exercise tolerance: good (>4 METS),   (+) hypertension:,         Rhythm: regular  Rate: normal           Beta Blocker:  Not on Beta Blocker         Neuro/Psych:   Negative Neuro/Psych ROS              GI/Hepatic/Renal:            ROS comment: Diverticulitis. Endo/Other: Negative Endo/Other ROS                    Abdominal:             Vascular: negative vascular ROS. Other Findings:             Anesthesia Plan      general     ASA 2       Induction: intravenous. MIPS: Postoperative opioids intended and Prophylactic antiemetics administered. Anesthetic plan and risks discussed with patient and spouse. Use of blood products discussed with patient whom consented to blood products. Plan discussed with CRNA and attending.                   Bright Anguiano RN   8/25/2021

## 2021-08-25 NOTE — OP NOTE
Operative Note      Patient: Praveen Ta  YOB: 1975  MRN: [id number]    Date of Procedure: 8/25/2021    Pre-Op Diagnosis:   Right foot trauma with multiple tarsometatarsal joint fracture dislocations, medial cuneiform fracture. Post-Op Diagnosis: Same       Procedure(s):  1. Right foot first tarsometatarsal joint dislocation closed reduction percutaneous pinning  2. Right foot second tarsometatarsal joint dislocation closed reduction percutaneous pinning  3. Right foot fourth and fifth tarsometatarsal joint dislocations closed reduction with percutaneous pinning fifth TMT joint    Surgeon(s):  Cameron Casper DO    Assistant:   Resident: Megan Liu DO    Anesthesia: General    Estimated Blood Loss (mL): Minimal    Complications: None    Specimens:   * No specimens in log *    Implants:  Implant Name Type Inv. Item Serial No.  Lot No. LRB No. Used Action   K WIRE FIX L6IN DIA0.062IN [id number] [facility] [employer]]  K WIRE FIX L6IN DIA0.062IN [[id number]] [[facility] [employer]]  [facility] [employer]WD [id number] Right 3 Implanted         Drains: * No LDAs found *    Findings: Highly unstable homolateral Lisfranc injury to the right foot with fractures involving the bases of the second through fifth metatarsals as well as first through fifth tarsometatarsal joint dislocations and comminuted medial cuneiform fracture. Detailed Description of Procedure:   Patient brought to the operative suite where he was placed on upper table supine position. Received a general anesthetic by department esthesia as well succumbs of Ancef intravenously. Right lower extremity sterilely prepped and draped out standard sterile fashion. Surgical timeout is performed predicable member surgical team.  Procedure first began with reduction of the fracture dislocations.   Using fluoroscopic guidance the first tarsometatarsal joint which was completely dislocated was reduced by manipulation in a closed manner. Once this was appropriate reduced a percutaneous Jerson wire was placed to maintain the reduction. Next the second tarsometatarsal joint which was still translated laterally was reduced in multiple views and stabilized with a percutaneous Jerson wire under fluoroscopic guidance. Finally the lateral rays were also reduced by closed means with fluoroscopic guidance and a percutaneous wires utilized stabilizing the fifth tarsometatarsal joint. This point time I feel that it overall appropriate duction of the Lisfranc injury fracture dislocations. Final images were taken saved to [employer] system. Pins were bent and cut above the skin. Pins were dressed with Xeroform well-padded bulky Dumont splint was applied. Patient then awoken uneventfully from anesthetic transfer onto the hospital rney to the postanesthesia care in stable condition. Postoperative plans:  Patient will be admitted overnight for 24 hours of postop antibiotics and pain control. Is to maintain a splint, strict elevation nonweightbearing affected extremity. Once discharged from hospital follow-up in orthopedic office in approximately 7-10 days for repeat evaluation of the soft tissues to determine potential further definitive planning. He will be started on aspirin for DVT prophylaxis. Electronically signed by Ilana Russell DO on 8/25/2021     NOTE: This report was transcribed using voice recognition software.  Every effort was made to ensure accuracy; however, inadvertent computerized transcription errors may be present

## 2021-08-25 NOTE — ED NOTES
Message sent to ortho resident to notify physician that he needs to enter order and to notify again that patient has partially removed splint.       Xuan Mayorga RN  08/25/21 6137

## 2021-08-25 NOTE — ED NOTES
Asked patient if procedure was explained to him and all questions answered. Patient stated Raoul Reynolds said we would talk more about it tomorrow\". Treatment consent not signed at this time.       Jerzy Russell RN  08/25/21 6846

## 2021-08-25 NOTE — ED NOTES
Observed patient standing in hallway asking for pain medication, stating \"Nancy been waiting two hours\". Explained to patient that I am currently with another patient and that he needs to stay in bed.       Sidney Roy RN  08/25/21 0127

## 2021-08-25 NOTE — H&P
Department of Orthopedic Surgery  Resident Consult Note  Reason for Consult: Right foot pain    HISTORY OF PRESENT ILLNESS:       Patient is a 39 y.o. male with right foot pain after sustaining a fall from approximately 20 feet. Patient reports that he was adjusting his tree stand when the stand dislodged from the tree and he began to fall. He reports that he intentionally landed directly on his right foot and tried to roll to break his fall. Patient noted immediate pain about the right foot and was unable to ambulate after the fall. Denies head trauma or LOC. He was initially seen at [address] [place], [place] [address] [address] emergency department and was transferred to [facility] for higher level of care. Denies numbness/tingling/paresthesias. Denies any other orthopedic complaints at this time. Past Medical History:        Diagnosis Date    Diverticulitis     Hypertension      Past Surgical History:        Procedure Laterality Date    COLON SURGERY       Current Medications:   Current Facility-Administered Medications: ondansetron (ZOFRAN) injection 4 mg, 4 mg, IntraVENous, Q6H PRN  Allergies:  Patient has no known allergies. Social History:   TOBACCO:   reports that he has never smoked. He does not have any smokeless tobacco history on file. ETOH:   reports no history of alcohol use. DRUGS:   reports no history of drug use. ACTIVITIES OF DAILY LIVING:    OCCUPATION:    Family History:   History reviewed. No pertinent family history.     REVIEW OF SYSTEMS:  CONSTITUTIONAL:  negative for fevers, chills  EYES:  negative for acute changes  HEENT:  negative for acute changes  RESPIRATORY:  negative for dyspnea  CARDIOVASCULAR:  negative for chest pain, palpitations  GASTROINTESTINAL:  negative for nausea, vomiting  GENITOURINARY:  negative for frequency  HEMATOLOGIC/LYMPHATIC:  negative for bleeding and petechiae  MUSCULOSKELETAL:  positive for right foot pain and edema  NEUROLOGICAL:  negative for head trauma or LOC  BEHAVIOR/PSYCH:  negative for increased agitation and anxiety    PHYSICAL EXAM:    VITALS:  BP (!) 141/98   Pulse 108   Temp 97.1 °F (36.2 °C)   Resp 16   Ht 5' 8\" (1.727 m)   Wt 237 lb (107.5 kg)   SpO2 95%   BMI 36.04 kg/m²   CONSTITUTIONAL:  awake, alert, cooperative, no apparent distress, and appears stated age  EYES: Lids and lashes normal, pupils equal, round and reactive to light, extra ocular muscles intact, sclera clear, conjunctiva normal  ENT: Normocephalic, without obvious abnormality, atraumatic, external ears without lesions, oral pharynx with moist mucus membranes  NECK: Trachea midline, skin normal  LUNGS: No increased work of breathing, good air exchange  CARDIOVASCULAR: 2+ pulses throughout and capillary Refill less than 2 seconds  ABDOMEN: soft, non-distended, non-tender and no rebound tenderness or guarding  NEUROLOGIC: Awake, alert, oriented to name, place and time. Cranial nerves II-XII are grossly intact. Motor is 5 out of 5 bilaterally. Sensory is intact. Babinski down going    MUSCULOSKELETAL:  Right lower Extremity:  Significant edema about the right foot  Positive TTP at the base of the first through third metatarsals  Compartments soft and compressible  Calf soft and non tender  +PF/DF/EHL  +2/4 DP & PT pulses, Brisk Cap refill, Toes warm and perfused  Distal sensation grossly intact to Peroneals, Sural, Saphenous, and tibial nrs    Secondary Exam:   · Bilateral UE: No obvious signs of trauma. -TTP to fingers, hand, wrist, forearm, elbow, humerus, shoulder or clavicle. -- Patient able to flex/extend fingers, wrist, elbow and shoulder with active and passive ROM without pain, +2/4 Radial pulse, cap refill <3sec, +AIN/PIN/Radial/Ulnar/Median N, distal sensation grossly intact to C4-T1 dermatomes, compartments soft and compressible. · Left LE: No obvious signs of trauma.    -TTP to foot, ankle, leg, knee, thigh, hip.-- Patient able to flex/extend toes, ankle, knee and hip with active and passive ROM without pain,+2/4 DP & PT pulses, cap refill <3sec, +5/5 PF/DF/EHL, distal sensation grossly intact to L4-S1 dermatomes, compartments soft and compressible. · Pelvis: -TTP, -Log roll, -Heel strike     DATA:    CBC:   Lab Results   Component Value Date    WBC 10.2 08/24/2021    RBC 5.32 08/24/2021    HGB 16.3 08/24/2021    HCT 44.5 08/24/2021    MCV 83.6 08/24/2021    MCH 30.6 08/24/2021    MCHC 36.6 08/24/2021    RDW 12.4 08/24/2021     08/24/2021    MPV 10.0 08/24/2021     PT/INR:  No results found for: PROTIME, INR    Radiology Review:  X-ray and CT of the right foot demonstrates Lisfranc fracture with dislocation of the first, second, and third metatarsal bases in relation to the cuneiform bones. There is also comminuted fracture of the medial cuneiform. No other fractures or dislocations appreciated. IMPRESSION:  · Closed right Lisfranc fracture dislocation    PLAN:  After informed consent was verbally obtained,closed reduction was performed with application of well padded 3 sided bulky Dumont splint.   Patient tolerated well and remained neurovascularly intact pre and post reduction  · We will plan for acute orthopedic surgical intervention, right foot Lisfranc percutaneous pinning versus open reduction internal fixation 8/25 with Dr. Yojana Manning  · N.p.o. now  · Treatment consent  · Preoperative labs  · Post reduction x-ray right foot  · Multimodal pain control  · Rest, ice, elevate right lower extremity  · Plan to be discussed with attending    All questions were sought and answered during encounter    Electronically signed by Domenico Hudson DO on 8/25/2021 at 12:35 AM

## 2021-08-25 NOTE — ED NOTES
Informed patient that I have not received call back from ortho resident regarding request for Dilaudid. Patient states he will take morphine at this time.       Sandeep Odell RN  08/25/21 0743

## 2021-08-25 NOTE — PROGRESS NOTES
Perfect serve sent to Dr. Joellen Cooper about patient being unable to void; bladder scanned and 500mL remained in the bladder  Armando Rosen RN

## 2021-08-25 NOTE — ED NOTES
Bed: 36  Expected date:   Expected time:   Means of arrival:   Comments:  terminal     Annie Sanches RN  08/25/21 5406

## 2021-08-25 NOTE — ED NOTES
Patient on call light asking for pain medication. Unit clerk updated patient that a message has been sent to ortho resident to notify physician.       Francois Diop RN  08/25/21 2131

## 2021-08-25 NOTE — ED NOTES
Checked Perfect Serve- noted message from resident at 6053 that states \"Ok for dilaudid. Dc morphine. This RN unable to enter order without any dose or frequency given by physician.       David Tapia RN  08/25/21 8479

## 2021-08-25 NOTE — CONSULTS
INPATIENT CONSULTATION RECORD FOR  8/25/2021      [facility]  [address] [facility], [address] [facility]  [phone]        REASON FOR CONSULTATION: Acute situational urinary retention/Senior placement      HISTORY OF PRESENT ILLNESS:      The patient is a 39 y.o. male patient who was taking down his tree stand recently and fell out of a tree at about 20 feet. He landed on his right foot and sustained an orthopedic injury. He underwent fracture repair of his foot today by Dr. Severo Paris. He is to go home tomorrow. He has been unable to void postoperatively. Bladder scan PVR was approximately 500 cc. He typically has no prior voiding difficulties. He denies hematuria, dysuria, UTIs. He is a good stream typically. He has never seen a urologist.      Past Medical History:   Diagnosis Date    Diverticulitis     Hypertension          Past Surgical History:   Procedure Laterality Date    COLON SURGERY     Exploratory laparotomy 10 years ago    Medications Prior to Admission:    Medications Prior to Admission: escitalopram (LEXAPRO) 10 MG tablet, Take 10 mg by mouth daily  losartan (COZAAR) 50 MG tablet, Take 50 mg by mouth daily  enalapril (VASOTEC) 10 MG tablet, Take 10 mg by mouth 2 times daily   hydroCHLOROthiazide (HYDRODIURIL) 25 MG tablet, Take 25 mg by mouth daily  hydrOXYzine (ATARAX) 25 MG tablet, Take 25 mg by mouth 3 times daily as needed for Anxiety     Allergies:    Patient has no known allergies. Social History:   He  reports that he has never smoked. He does not have any smokeless tobacco history on file. He reports that he does not drink alcohol and does not use drugs. He is  with 2 children. He is originally from [place], [place]. He works for [employer] History:   Non-contributory to this Urological problem  family history is not on file.     REVIEW OF SYSTEMS:  Respiratory: denies any symptoms of a productive cough, shortness of breath, or hemoptysis  Cardiovascular: denies chest pain, dyspnea, or cardiac murmur  Gastrointestinal: negative for abdominal pain, diarrhea, or constipation  Dermatologic: denies any rashes, skin lesion(s), or pruritis  Neurological: negative for headaches, seizures, and tremors  Endocrine: negative for diabetic symptoms including polydipsia and polyuria or thyroid dysfunction  Ocular: denies retinopathy or glaucoma  ENT: denies sinusitis or epistaxis  Constitutional: denies nausea, vomiting, fever, or chills  Psychiatric: denies anxiety or depression  : denies hematuria, dysuria, UTIs    PHYSICAL EXAM:    Vitals:  BP (!) 150/85   Pulse 85   Temp 97.2 °F (36.2 °C) (Temporal)   Resp 18   Ht 5' 8\" (1.727 m)   Wt 237 lb (107.5 kg)   SpO2 94%   BMI 36.04 kg/m²     General:  Awake, alert, oriented X 3. Well developed, well nourished, well groomed. No apparent distress. HEENT:  Normocephalic, atraumatic. Pupils equal, round. No scleral icterus. No conjunctival injection. Normal lips, teeth, and gums. No nasal discharge. Neck:  Supple, no masses. Heart:  RRR. Lungs:  No audible wheezing. Respirations symmetric and non-labored. Clear bilaterally. Abdomen:  Soft, nontender, no masses, no organomegaly, no peritoneal signs. Active bowel sounds. No rebound or guarding. No flank or CVA tenderness. Extremities:  No clubbing, cyanosis, or edema. Brisk peripheral pulses. His right lower leg is wrapped in a brace  Skin:  Warm and dry, no open lesions or rashes. Neuro:  Cranial nerves 2-12 intact, no focal motor or sensory deficits. Alert and oriented. Rectal: Deferred  Genitalia: Circumcised male without lesions or deformity. Testes are normal bilaterally. There is no hydrocele, hernia, varicocele bilaterally.   Spermatic cord and vas deferens are normal bilaterally    LABS:    Lab Results   Component Value Date    WBC 10.2 08/24/2021    HGB 16.3 08/24/2021    HCT 44.5 08/24/2021    MCV 83.6 08/24/2021    PLT 265 08/24/2021       Lab Results   Component Value Date    BUN 16 08/24/2021    CREATININE 1.1 08/24/2021       No results found for: PSA      ASSESSMENT / PLAN:    Acute situational urinary retention/Senior placement his external genitalia were prepped and draped sterilely. 10 cc of Xylocaine was instilled per urethra. A 16 French coudé catheter was inserted per urethra and into the bladder with minimal difficulty. There is return of approximately 1 L of urine. The balloon was inflated and the catheter was secured to his right thigh and connected to gravity drainage. He will be started on short-term Flomax for 1 week. He will remove his catheter at home this Friday morning as instructed. He will follow-up with me as needed. Thank you for allowing me to assist in his care.   Sincerely,        Bar Reyna MD  7:33 PM  8/25/2021

## 2021-08-25 NOTE — H&P
Department of Orthopedic Surgery  Resident H&P Note      Reason for Consult: Right foot pain     HISTORY OF PRESENT ILLNESS:                   Patient is a 39 y.o. male with right foot pain after sustaining a fall from approximately 20 feet. Patient reports that he was adjusting his tree stand when the stand dislodged from the tree and he began to fall. He reports that he intentionally landed directly on his right foot and tried to roll to break his fall. Patient noted immediate pain about the right foot and was unable to ambulate after the fall. Denies head trauma or LOC. He was initially seen at [address] [place], [place] [address] emergency department and was transferred to [facility] for higher level of care. Denies numbness/tingling/paresthesias. Denies any other orthopedic complaints at this time.       Past Medical History:    Past Medical History             Diagnosis Date    Diverticulitis      Hypertension           Past Surgical History:    Past Surgical History             Procedure Laterality Date    COLON SURGERY             Current Medications:   Current Hospital Medications   Current Facility-Administered Medications: ondansetron (ZOFRAN) injection 4 mg, 4 mg, IntraVENous, Q6H PRN     Allergies:  Patient has no known allergies.     Social History:   TOBACCO:   reports that he has never smoked. He does not have any smokeless tobacco history on file. ETOH:   reports no history of alcohol use. DRUGS:   reports no history of drug use. ACTIVITIES OF DAILY LIVING:    OCCUPATION:    Family History:   Family History   History reviewed.  No pertinent family history.        REVIEW OF SYSTEMS:  CONSTITUTIONAL:  negative for fevers, chills  EYES:  negative for acute changes  HEENT:  negative for acute changes  RESPIRATORY:  negative for dyspnea  CARDIOVASCULAR:  negative for chest pain, palpitations  GASTROINTESTINAL:  negative for nausea, vomiting  GENITOURINARY:  negative for frequency  HEMATOLOGIC/LYMPHATIC:  negative for bleeding and petechiae  MUSCULOSKELETAL:  positive for right foot pain and edema  NEUROLOGICAL:  negative for head trauma or LOC  BEHAVIOR/PSYCH:  negative for increased agitation and anxiety     PHYSICAL EXAM:    VITALS:  BP (!) 141/98   Pulse 108   Temp 97.1 °F (36.2 °C)   Resp 16   Ht 5' 8\" (1.727 m)   Wt 237 lb (107.5 kg)   SpO2 95%   BMI 36.04 kg/m²   CONSTITUTIONAL:  awake, alert, cooperative, no apparent distress, and appears stated age  EYES: Lids and lashes normal, pupils equal, round and reactive to light, extra ocular muscles intact, sclera clear, conjunctiva normal  ENT: Normocephalic, without obvious abnormality, atraumatic, external ears without lesions, oral pharynx with moist mucus membranes  NECK: Trachea midline, skin normal  LUNGS: No increased work of breathing, good air exchange  CARDIOVASCULAR: 2+ pulses throughout and capillary Refill less than 2 seconds  ABDOMEN: soft, non-distended, non-tender and no rebound tenderness or guarding  NEUROLOGIC: Awake, alert, oriented to name, place and time. Cranial nerves II-XII are grossly intact. Motor is 5 out of 5 bilaterally. Sensory is intact. Babinski down going     MUSCULOSKELETAL:  Right lower Extremity:  · Significant edema about the right foot  · Positive TTP at the base of the first through third metatarsals  · Compartments soft and compressible  · Calf soft and non tender  · +PF/DF/EHL  · +2/4 DP & PT pulses, Brisk Cap refill, Toes warm and perfused  · Distal sensation grossly intact to Peroneals, Sural, Saphenous, and tibial nrs     Secondary Exam:   · Bilateral UE: No obvious signs of trauma. -TTP to fingers, hand, wrist, forearm, elbow, humerus, shoulder or clavicle. -- Patient able to flex/extend fingers, wrist, elbow and shoulder with active and passive ROM without pain, +2/4 Radial pulse, cap refill <3sec, +AIN/PIN/Radial/Ulnar/Median N, distal sensation grossly intact to C4-T1 dermatomes, compartments soft and compressible.     · Left LE: No obvious signs of trauma. -TTP to foot, ankle, leg, knee, thigh, hip.-- Patient able to flex/extend toes, ankle, knee and hip with active and passive ROM without pain,+2/4 DP & PT pulses, cap refill <3sec, +5/5 PF/DF/EHL, distal sensation grossly intact to L4-S1 dermatomes, compartments soft and compressible.     · Pelvis: -TTP, -Log roll, -Heel strike      DATA:    CBC:         Lab Results   Component Value Date     WBC 10.2 08/24/2021     RBC 5.32 08/24/2021     HGB 16.3 08/24/2021     HCT 44.5 08/24/2021     MCV 83.6 08/24/2021     MCH 30.6 08/24/2021     MCHC 36.6 08/24/2021     RDW 12.4 08/24/2021      08/24/2021     MPV 10.0 08/24/2021      PT/INR:  No results found for: PROTIME, INR     Radiology Review:  X-ray and CT of the right foot demonstrates Lisfranc fracture with dislocation of the first, second, and third metatarsal bases in relation to the cuneiform bones. There is also comminuted fracture of the medial cuneiform. No other fractures or dislocations appreciated.     IMPRESSION:  · Closed right Lisfranc fracture dislocation     PLAN:  · After informed consent was verbally obtained,closed reduction was performed with application of well padded 3 sided bulky Dumont splint.   Patient tolerated well and remained neurovascularly intact pre and post reduction  · We will plan for acute orthopedic surgical intervention, right foot Lisfranc percutaneous pinning versus open reduction internal fixation 8/25 with Dr. Salty Watts  · N.p.o. now  · Treatment consent  · Preoperative labs  · Post reduction x-ray right foot  · Multimodal pain control  · Rest, ice, elevate right lower extremity  · Plan to be discussed with attending     All questions were sought and answered during encounter     Electronically signed by Rafal Cherry DO on 8/25/2021 at 12:35 AM      Orthopaedic Trauma Attending  H&P Note    I have seen and evaluated the patient and agree with the above assessment. I have performed the key components of the history and physical examination and concur completely with the findings as documented. CC: Right foot injury    HPI: This is a 39 y.o. male with right foot pain after sustaining a fall from approximately 20 feet. Patient reports that he was adjusting his tree stand when the stand dislodged from the tree and he began to fall. He reports that he intentionally landed directly on his right foot and tried to roll to break his fall. Patient noted immediate pain about the right foot and was unable to ambulate after the fall. Denies head trauma or LOC. He was initially seen at [address] [place], [place] [address] emergency department and was transferred to [facility] for higher level of care. Denies numbness/tingling/paresthesias. Denies any other orthopedic complaints at this time. Denies any back pain. ROS, medications, allergies, past medical/surgical/social/family histories reviewed and as above    PE:  /86   Pulse 92   Temp 98 °F (36.7 °C) (Infrared)   Resp 20   Ht 5' 8\" (1.727 m)   Wt 237 lb (107.5 kg)   SpO2 97%   BMI 36.04 kg/m²   As above    Radiographic Review:  Right foot Lisfranc fracture disruption homolateral first through fifth TMT joints with associated base fractures most obvious through the second metatarsal base but also appearing to involve the third/fourth/fifth metatarsals, associated medial cuneiform fracture. Persistent fracture dislocation displacements evidence attempted post reduction. ASSESSMENT:  Right foot Lisfranc injury    PLAN:  Had lengthy discussion with patient regarding their diagnosis, typical prognosis, and expected outcomes. We reviewed the possible complications from the injury itself despite treatment chosen. We also discussed treatment options including nonoperative managements versus surgical management, along with risks and benefits of each.    Patient has elected for surgical management despite associated risks. Discussed with patient plans for close versus open reduction with percutaneous pin fixation of his dislocated fracture dislocations of the right foot at this time due to his current soft tissue swelling. Discussed we will need to stage his definitive fixation when his soft tissues are appropriate. I have explained the risks and complications of the recommended surgery with the patient at length, as well as discussed potential treatment alternatives including nonoperative management. These risks include but are not limited to death or complication from anesthesia, continued pain, nerve tendon or vascular injury, infection, nonunion or malunion, stiffness, symptomatic hardware or hardware failure, unforeseen complications, deep vein thrombosis or pulmonary embolism, and need for further surgery, etc.  Patient understood this, asked appropriate questions, which were all answered, and he has elected to proceed with the procedure. No guarantees were provided. Did have a lengthy discussion with patient regarding the severity of his injury and overall fair to good prognosis at best despite treatment plans moving forward.     Electronically signed by   Hermelinda Valle DO  8/25/2021

## 2021-08-26 VITALS
HEIGHT: 68 IN | TEMPERATURE: 97.7 F | DIASTOLIC BLOOD PRESSURE: 71 MMHG | SYSTOLIC BLOOD PRESSURE: 126 MMHG | BODY MASS INDEX: 35.92 KG/M2 | RESPIRATION RATE: 18 BRPM | WEIGHT: 237 LBS | HEART RATE: 79 BPM | OXYGEN SATURATION: 94 %

## 2021-08-26 PROCEDURE — 6360000002 HC RX W HCPCS: Performed by: STUDENT IN AN ORGANIZED HEALTH CARE EDUCATION/TRAINING PROGRAM

## 2021-08-26 PROCEDURE — 97161 PT EVAL LOW COMPLEX 20 MIN: CPT

## 2021-08-26 PROCEDURE — G0378 HOSPITAL OBSERVATION PER HR: HCPCS

## 2021-08-26 PROCEDURE — 96376 TX/PRO/DX INJ SAME DRUG ADON: CPT

## 2021-08-26 PROCEDURE — 2580000003 HC RX 258: Performed by: STUDENT IN AN ORGANIZED HEALTH CARE EDUCATION/TRAINING PROGRAM

## 2021-08-26 PROCEDURE — 6370000000 HC RX 637 (ALT 250 FOR IP): Performed by: STUDENT IN AN ORGANIZED HEALTH CARE EDUCATION/TRAINING PROGRAM

## 2021-08-26 PROCEDURE — 97165 OT EVAL LOW COMPLEX 30 MIN: CPT

## 2021-08-26 RX ADMIN — HYDROMORPHONE HYDROCHLORIDE 0.5 MG: 1 INJECTION, SOLUTION INTRAMUSCULAR; INTRAVENOUS; SUBCUTANEOUS at 02:33

## 2021-08-26 RX ADMIN — BISACODYL 5 MG: 5 TABLET, COATED ORAL at 08:28

## 2021-08-26 RX ADMIN — ACETAMINOPHEN 650 MG: 325 TABLET ORAL at 08:28

## 2021-08-26 RX ADMIN — OXYCODONE 5 MG: 5 TABLET ORAL at 13:59

## 2021-08-26 RX ADMIN — ACETAMINOPHEN 650 MG: 325 TABLET ORAL at 02:32

## 2021-08-26 RX ADMIN — SODIUM CHLORIDE, PRESERVATIVE FREE 10 ML: 5 INJECTION INTRAVENOUS at 08:29

## 2021-08-26 RX ADMIN — Medication 2000 MG: at 05:43

## 2021-08-26 RX ADMIN — OXYCODONE HYDROCHLORIDE 10 MG: 10 TABLET ORAL at 01:19

## 2021-08-26 RX ADMIN — ACETAMINOPHEN 650 MG: 325 TABLET ORAL at 13:59

## 2021-08-26 RX ADMIN — HYDROMORPHONE HYDROCHLORIDE 0.5 MG: 1 INJECTION, SOLUTION INTRAMUSCULAR; INTRAVENOUS; SUBCUTANEOUS at 08:29

## 2021-08-26 ASSESSMENT — PAIN DESCRIPTION - DESCRIPTORS: DESCRIPTORS: ACHING;TENDER

## 2021-08-26 ASSESSMENT — PAIN DESCRIPTION - ONSET: ONSET: ON-GOING

## 2021-08-26 ASSESSMENT — PAIN SCALES - GENERAL
PAINLEVEL_OUTOF10: 9
PAINLEVEL_OUTOF10: 8
PAINLEVEL_OUTOF10: 0
PAINLEVEL_OUTOF10: 6
PAINLEVEL_OUTOF10: 8

## 2021-08-26 ASSESSMENT — PAIN - FUNCTIONAL ASSESSMENT: PAIN_FUNCTIONAL_ASSESSMENT: PREVENTS OR INTERFERES SOME ACTIVE ACTIVITIES AND ADLS

## 2021-08-26 ASSESSMENT — PAIN DESCRIPTION - FREQUENCY: FREQUENCY: CONTINUOUS

## 2021-08-26 ASSESSMENT — PAIN DESCRIPTION - LOCATION: LOCATION: FOOT

## 2021-08-26 ASSESSMENT — PAIN DESCRIPTION - PROGRESSION: CLINICAL_PROGRESSION: GRADUALLY IMPROVING

## 2021-08-26 ASSESSMENT — PAIN DESCRIPTION - ORIENTATION: ORIENTATION: RIGHT

## 2021-08-26 ASSESSMENT — PAIN DESCRIPTION - PAIN TYPE: TYPE: ACUTE PAIN

## 2021-08-26 NOTE — ANESTHESIA POSTPROCEDURE EVALUATION
Department of Anesthesiology  Postprocedure Note    Patient: Cy Huang  MRN: [id number]  YOB: 1975  Date of evaluation: 8/26/2021  Time:  6:26 AM     Procedure Summary     Date: 08/25/21 Room / Location: [facility] OR 09 / [facility]    Anesthesia Start: 1604 Anesthesia Stop: 457    Procedure: RIGHT FOOT LISFRANC  PERCUTANEOUS PINNING (Right Foot) Diagnosis: (.)    Surgeons: Nat Alatorre DO Responsible Provider: Ted Hoang DO    Anesthesia Type: general ASA Status: 2          Anesthesia Type: general    Joni Phase I: Joni Score: 9    Joni Phase II: Joni Score: 10    Last vitals: Reviewed and per EMR flowsheets.        Anesthesia Post Evaluation    Patient location during evaluation: PACU  Patient participation: complete - patient participated  Level of consciousness: awake and alert  Airway patency: patent  Nausea & Vomiting: no nausea and no vomiting  Complications: no  Cardiovascular status: blood pressure returned to baseline  Respiratory status: acceptable  Hydration status: euvolemic

## 2021-08-26 NOTE — PROGRESS NOTES
Department of Orthopedic Surgery  Resident Progress Note    Patient seen and examined. Pain controlled. No new complaints. Denies chest pain, shortness of breath, dizziness/lightheadedness. Patient doing well this morning denies numbness or tingling at this time. Pain is well managed this morning. VITALS:  /76   Pulse 86   Temp 98.1 °F (36.7 °C)   Resp 18   Ht 5' 8\" (1.727 m)   Wt 237 lb (107.5 kg)   SpO2 96%   BMI 36.04 kg/m²     General: alert and oriented to person, place and time, well-developed and well-nourished, in no acute distress    MUSCULOSKELETAL:   right lower extremity:  · Dressing C/D/I  · Compartments soft and compressible  · +PF/DF/EHL   Brisk Cap refill, Toes warm and perfused  · Distal sensation grossly intact to Peroneals, Sural, Saphenous, and tibial nrs    CBC:   Lab Results   Component Value Date    WBC 10.2 08/24/2021    HGB 16.3 08/24/2021    HCT 44.5 08/24/2021     08/24/2021     PT/INR:    Lab Results   Component Value Date    PROTIME 12.5 08/25/2021    INR 1.1 08/25/2021           ASSESSMENT  · S/P R lisfranc closed reduction percutaneous pinning - 8/25    PLAN      · Continue physical therapy and protocol: NWB - RLE  · 24 hour abx coverage  · Deep venous thrombosis prophylaxis - lovenox, early mobilization  · PT/OT  · Pain Control: IV and PO  · Monitor H&H  · D/C Plan:  Plan is for discharge home later today after the patient works with therapy.  Patient will follow up with Dr. Jhonny Marrufo in his office in one week for evaluation of soft tissues and planning for definitive management  · Discussed with attending

## 2021-08-26 NOTE — PROGRESS NOTES
Physical Therapy Initial Assessment     Name: Nicole Chow  : 1975  MRN: [id number]      Date of Service: 2021    Evaluating PT:  Sandra Pickard, PT, DPT [id number]      Room #:  2405/6539-M  Diagnosis:  Lisfranc dislocation, right, initial encounter [F89.892U]  PMHx/PSHx:  Diverticulitis, HTN  Procedure/Surgery:  2021 Right foot Lisfranc percutaneous pinning  Precautions:  Falls, NWB RLE  Equipment Needs:  Wheeled Walker  Mechanism of injury: Fall 20' from tree stand    SUBJECTIVE:    Pt lives with his wife and 2 children in a 1 story home with 2 stairs to enter and 1 grab bar/rail. Pt ambulated with no AD PTA. Pt works full time. OBJECTIVE:   Initial Evaluation  Date:    AM-PAC 6 Clicks 42/65   Was pt agreeable to Eval/treatment? Yes    Does pt have pain? No c/o pain. Bed Mobility  Rolling: Independent  Supine to sit: Independent  Sit to supine: Independent  Scooting: Independent   Transfers Sit to stand: Mod I  Stand to sit: Mod I  Stand pivot: Mod I   Ambulation    50 feet with WW Mod I, 50 feet with axillary crutches with SBA   Stair negotiation: ascended and descended  2 steps with bilateral axillary crutches with SBA/CGA  Retro ascend, fwd descend   ROM BUE:  WFL  BLE:  WFL, except R ankle   Strength BUE:  5/5  BLE:  5/5, except R ankle   Balance Sitting EOB:  Independent  Dynamic Standing:  Mod I with [employer], SBA with axillary crutches     Pt is A & O x 4  Sensation:  Pt denies numbness and tingling to extremities  Edema:  None noted    Vitals:  Blood Pressure at rest - Blood Pressure post session -   Heart Rate at rest - Heart Rate post session -   SPO2 at rest - SPO2 post session -     Therapeutic Exercises:  NA    Patient education  Pt educated on purpose of PT assessment, importance of mobility, safety with mobility, transfers, gait, use of AD for safety    Patient response to education:   Pt verbalized understanding Pt demonstrated skill Pt requires further education in this area   Yes  Yes with verbal cues Yes/Reinforcement     ASSESSMENT:    Conditions Requiring Skilled Therapeutic Intervention: NA    []Decreased strength     []Decreased ROM  []Decreased functional mobility  []Decreased balance   []Decreased endurance   []Decreased posture  []Decreased sensation  []Decreased coordination   []Decreased vision  []Decreased safety awareness   []Increased pain       Comments:  Patient cleared by RN and agreeable to treatment. Patient found in semi Kessler's and able to perform bed mobility independently and denied dizziness with positional change. Patient educated on WB restrictions prior to further mobility and provided demo of [employer] use and axillary crutch use. Patient demonstrated improved balance and safety with WW use and able to maintain WB restrictions during gait. Patient with slower gait speed, and guarded with axillary crutches during gait and recommend [employer] at this time for safety. Patient able to ascend 2 steps in retro manner, with one mild LOB due to left foot cramping but patient able to self correct. Patient assisted back to seated EOB with call light and tray table in reach. CM informed of DME needs. Pt's/ family goals   1. To go home.     PHYSICAL THERAPY PLAN OF CARE:    PT POC is established based on physician order and patient diagnosis     Referring provider/PT Order:    08/25/21 1545  PT eval and treat Start: 08/25/21 1545, End: 08/25/21 1545, ONE TIME, Standing Count: 1 Occurrences, R      Emily Nguyen DO       Diagnosis:  Lisfranc dislocation, right, initial encounter [O63.089A]  Specific instructions for next treatment:  Na    Current Treatment Recommendations:     [] Strengthening to improve independence with functional mobility   [] ROM to improve independence with functional mobility   [] Balance Training to improve static/dynamic balance and to reduce fall risk  [] Endurance Training to improve activity tolerance during functional mobility   [] Transfer

## 2021-08-26 NOTE — PROGRESS NOTES
CLINICAL PHARMACY NOTE: MEDS TO BEDS    Total # of Prescriptions Filled: 3   The following medications were delivered to the patient:  · Percocet 5-325mg  · Aspirin 325mg  · flomax 0.4mg    Additional Documentation:    Delivered to patient 8/26 @2:55pm

## 2021-08-26 NOTE — PROGRESS NOTES
Discharge instructions given to patient. All questions and concerns answered to patient's satisfaction. Belongings packed and sent with patient.

## 2021-08-26 NOTE — PROGRESS NOTES
OCCUPATIONAL THERAPY INITIAL EVALUATION    [address] [address] [facility] [address] [place], [place]     Date:2021  Patient Name: Cy Huang  MRN: [id number]  : 1975  Room: [address] [place] [address]      Evaluating OT: Emi Enriquez, [facility] Shantellbrigitte Keren Ronquillo OTR/L [id number]    Referring Provider: Herminio Cope DO     Specific Provider Orders/Date: OT evaluation and treatment 21    AM-PAC Daily Activity Raw Score:     Recommended Adaptive Equipment: ww and shower chair     Diagnosis: Lisfranc Dislocation R, initial encounter   Surgery: RIGHT FOOT LISFRANC  PERCUTANEOUS PINNING 21  Mechanism of Injury: Fall from [employer] tree stand  Pertinent Medical History: HTN, Diverticulitis      Precautions:  Falls, NWB RLE, Bulky Dumont splint RLE     Assessment of current deficits:   [] Functional mobility   []ADLs  [] Strength               []Cognition   [] Functional transfers   [] IADLs         [] Safety Awareness   []Endurance   [] Fine Coordination              [x] Balance      [] Vision/perception   []Sensation    []Gross Motor Coordination  [] ROM  [] Delirium                   [] Motor Control     OT PLAN OF CARE   OT POC based on physician orders, patient diagnosis and results of clinical assessment    Frequency/Duration : NA  Specific OT Treatment to include: NA      Home Living: Pt lives with wife and 2 children (15and 8years old) in 1 story home with 2 stairs and grab bar to enter. Pt reports basement in home, however no need to access)  Bathroom setup: walk in shower (small threshold)  Equipment owned: crutches    Prior Level of Function: Independent  with ADLs , Minimal Assist  with IADLs from wife  using no AD for ambulation.    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)    Pain Level: 0/10 during session - pt reports RN just provided medication prior to session  Cognition: A&O: 4/4; Follows multi step directions   Memory:  good    Sequencing:  good Problem solving:  good    Judgement/safety:  good      Functional Assessment:   Initial Eval Status  Date: 8/26/21   Feeding Independent    Grooming Independent    UB Dressing Independent    LB Dressing Independent    Bathing Independent   Toileting SBA  Educated pt on use of ww for toileting transfer   Bed Mobility  Supine to sit: NT  Pt seated EOB  Sit to supine: NT  Pt seated EOB    Functional Transfers Sit to stand: Mod   Ind with ww  Stand to sit: Mod   Ind with ww  Stand pivot: Mod   Ind with ww    Functional Mobility Indep with no AD   Balance Sitting:     Static:  wfl    Dynamic:wfl  Standing: wfl   Activity Tolerance wfl   Visual/  Perceptual Glasses: yes- pt reports for driving only            Hand dominance: Righ  UE ROM: RUE:  WFL  LUE:  WFL  Strength: RUE: grossly 5/5 LUE: grossly 5/5   Strength: B WFL  Fine Motor Coordination:  B WFL    Hearing: WFL  Sensation:  No c/o numbness or tingling  Tone:  WFL  Edema: unremarkable                            Comments/Treatment: Upon arrival, patient seated EOB. Pt demonstrating independence with ADLs/mobility and good understanding of education/techniques. Pt educated on safety with toileting using ww and educated on LB dressing d/t splint and NWB restrictions. At end of session, patient seated EOB with call light and phone within reach, all lines and tubes intact. Recommend d/c from OT d/t no acute skilled needs at this time. Evaluation time includes thorough review of current medical information, gathering information on past medical & social history & PLOF, completion of standardized testing, informal observation of tasks, consultation with other medical professions/disciplines, assessment of data & development of POC/goals.      Evaluation Complexity: Low    Time In: 0915  Time Out: 0930      Min Units   OT Eval Low 14211  x     OT Eval Medium 16834      OT Eval High 81959       OT Re-Eval [id number]       Therapeutic Ex 88531       Therapeutic Activities        ADL/Self Care [facility]       Orthotic Management [id number]       Neuro Re-Ed 90957       Non-Billable Time           PERLA Urban OTR/L #[id number]

## 2021-09-02 DIAGNOSIS — S93.324A LISFRANC DISLOCATION, RIGHT, INITIAL ENCOUNTER: Primary | ICD-10-CM

## 2021-09-10 ENCOUNTER — PREP FOR PROCEDURE (OUTPATIENT)
Dept: ORTHOPEDIC SURGERY | Age: 46
End: 2021-09-10

## 2021-09-10 ENCOUNTER — HOSPITAL ENCOUNTER (OUTPATIENT)
Age: 46
Discharge: HOME | End: 2021-09-12
Payer: COMMERCIAL

## 2021-09-10 ENCOUNTER — HOSPITAL ENCOUNTER (OUTPATIENT)
Dept: GENERAL RADIOLOGY | Age: 46
Discharge: HOME | End: 2021-09-12
Payer: COMMERCIAL

## 2021-09-10 ENCOUNTER — OFFICE VISIT (OUTPATIENT)
Dept: ORTHOPEDIC SURGERY | Age: 46
End: 2021-09-10
Payer: COMMERCIAL

## 2021-09-10 VITALS — TEMPERATURE: 97.1 F

## 2021-09-10 DIAGNOSIS — S93.324A LISFRANC DISLOCATION, RIGHT, INITIAL ENCOUNTER: ICD-10-CM

## 2021-09-10 DIAGNOSIS — S93.324A LISFRANC DISLOCATION, RIGHT, INITIAL ENCOUNTER: Primary | ICD-10-CM

## 2021-09-10 DIAGNOSIS — Z11.59 SCREENING FOR VIRAL DISEASE: ICD-10-CM

## 2021-09-10 PROCEDURE — U0005 INFEC AGEN DETEC AMPLI PROBE: HCPCS

## 2021-09-10 PROCEDURE — 29515 APPLICATION SHORT LEG SPLINT: CPT

## 2021-09-10 PROCEDURE — 73630 X-RAY EXAM OF FOOT: CPT

## 2021-09-10 PROCEDURE — 99212 OFFICE O/P EST SF 10 MIN: CPT

## 2021-09-10 PROCEDURE — U0003 INFECTIOUS AGENT DETECTION BY NUCLEIC ACID (DNA OR RNA); SEVERE ACUTE RESPIRATORY SYNDROME CORONAVIRUS 2 (SARS-COV-2) (CORONAVIRUS DISEASE [COVID-19]), AMPLIFIED PROBE TECHNIQUE, MAKING USE OF HIGH THROUGHPUT TECHNOLOGIES AS DESCRIBED BY CMS-2020-01-R: HCPCS

## 2021-09-10 PROCEDURE — 99024 POSTOP FOLLOW-UP VISIT: CPT | Performed by: PHYSICIAN ASSISTANT

## 2021-09-10 RX ORDER — SODIUM CHLORIDE 0.9 % (FLUSH) 0.9 %
10 SYRINGE (ML) INJECTION EVERY 12 HOURS SCHEDULED
Status: CANCELLED | OUTPATIENT
Start: 2021-09-10

## 2021-09-10 RX ORDER — SODIUM CHLORIDE 9 MG/ML
25 INJECTION, SOLUTION INTRAVENOUS PRN
Status: CANCELLED | OUTPATIENT
Start: 2021-09-10

## 2021-09-10 RX ORDER — SODIUM CHLORIDE, SODIUM LACTATE, POTASSIUM CHLORIDE, CALCIUM CHLORIDE 600; 310; 30; 20 MG/100ML; MG/100ML; MG/100ML; MG/100ML
INJECTION, SOLUTION INTRAVENOUS CONTINUOUS
Status: CANCELLED | OUTPATIENT
Start: 2021-09-10

## 2021-09-10 RX ORDER — SODIUM CHLORIDE 0.9 % (FLUSH) 0.9 %
10 SYRINGE (ML) INJECTION PRN
Status: CANCELLED | OUTPATIENT
Start: 2021-09-10

## 2021-09-10 NOTE — PATIENT INSTRUCTIONS
1. Your surgery is scheduled for Right Foot Open Reduction with Internal Fixation- Definitive fixation on Wednesday 9/15/2021 at 12:30 PM  with Dr. Adeline Cook DO at the [facility] on [facility] in [facility] . You will need to report to Preop area  that morning at 10:30 AM      2. You are having Outpatient surgery so you will be returning home the same day    3. Preadmission Testing (PAT) department at [facility] will contact you with all the details prior to surgery. 4. Nothing to eat or drink after midnight the night before surgery. You may take a pain pill and any other medicine PAT instructs you to take with small sip of water if needed. 5. Keep splint clean and dry. Do not remove or get wet. 6. Continue with ice and elevation to reduce swelling- elevate above your pelvis    7. No weight bearing right lower extremity, use assistive devices    8. Take pain medicine as instructed    9. Call office with any question or concerns: [phone]. Hold ASA/LOVENOX the day of surgery    11. Pre Op COVID 19 testing needed if not vaccinated    All surgical patients will be gradually tapered off narcotic pain medication post operatively, this office will not continue narcotics longer than 6 weeks from date of surgery. If narcotics are required longer than this time period without objective finding you will be referred to pain management. OUTPATIENT ORTHOPEDIC SURGERY  PRE-OP COVID TESTING    If you are fully vaccinated (at least 2 weeks from second dose of vaccine): No pre-operative COVID19 test is needed if you show your COVID19 vaccine card or a photo of the vaccine card at either your PAT (pre-admission testing) appointment or the day of your surgery. If you fail to show evidence of your COVID19 vaccination, a rapid test will be administered the day of your surgery.      Patient who are not fully vaccinated:     COVID19 testing is still required - please see below     We need to have COVID-19 Testing done 4 days (not including Sunday) prior to your scheduled surgery   After your testing is completed you will need to Self Quarantine until date of surgery     If your surgery is scheduled for:  Wednesday- Testing needs to be done Friday    Locations and hours are listed below: These sites will not test anyone without a physician order. The order can be in [facility] or can be a paper order.     [facility] ([address])   [facility] [address] TESTING: Monday - Friday 7AM -10 AM  WALK IN TESTING: Monday - Friday 8AM-4PM, Saturday 8AM-11AM    [place]- Monday-Friday 6a-230p  [address] [facility] Testing Monday -Friday 6a-10a only  Site will be [address] [place] [address], [facility] [address] [place] will follow white signs that say SURGERY TESTING   Please bring a valid photo ID with you   Please bring order for COVID 19 test with you   Pre-Admission Testing will also contact you to review COVID 19 testing and protocol    IF DONE OUTSIDE A [facility] FACILITY NEED TO Natasha

## 2021-09-10 NOTE — H&P (VIEW-ONLY)
Orthopaedic H&P Note     Katerine Zuniga is a 39 y.o. male, his YOB: 1975 with the following history as recorded in [facility]:             Patient Active Problem List     Diagnosis Date Noted    Lisfranc dislocation, right, initial encounter 08/25/2021      Current Facility-Administered Medications          Current Outpatient Medications   Medication Sig Dispense Refill    escitalopram (LEXAPRO) 10 MG tablet Take 10 mg by mouth daily        losartan (COZAAR) 50 MG tablet Take 50 mg by mouth daily        aspirin 325 MG EC tablet Take 1 tablet by mouth 2 times daily for 28 days 56 tablet 0    enalapril (VASOTEC) 10 MG tablet Take 10 mg by mouth 2 times daily         hydroCHLOROthiazide (HYDRODIURIL) 25 MG tablet Take 25 mg by mouth daily        hydrOXYzine (ATARAX) 25 MG tablet Take 25 mg by mouth 3 times daily as needed for Anxiety         tamsulosin (FLOMAX) 0.4 MG capsule Take 1 capsule by mouth nightly for 7 days 7 capsule 0      No current facility-administered medications for this visit.         Allergies: Patient has no known allergies. Past Medical History        Past Medical History:   Diagnosis Date    Diverticulitis      Hypertension           Past Surgical History         Past Surgical History:   Procedure Laterality Date    ANKLE FRACTURE SURGERY Right 8/25/2021     RIGHT FOOT LISFRANC  PERCUTANEOUS PINNING performed by Pilar Mack DO [facility] OR    COLON SURGERY             Family History   No family history on file. Social History            Tobacco Use    Smoking status: Never Smoker    Smokeless tobacco: Current User       Types: Chew   Substance Use Topics    Alcohol use: Never                                    Chief Complaint   Patient presents with    Follow-up       2wk post-op R lisfranc. Pain 0/10, denies numbness or tingling, fever or chills. Presents with splint and crutches. Admits to some WB for balance.     Other       XR in EPIC       SUBJECTIVE: Goyo Martínez is a 47-year-old male who sustained a right foot injury after a fall from approximately 20 feet. He states that he was adjusting his tree stand when the stand dislodged from the tree and he began to fall. He noted immediate pain about the right foot and was unable to ambulate after the fall. He [facility] to [facility] ED and was transferred to [facility]. X-rays showed a Lisfranc fracture with dislocation of the first, second and third metatarsal bases in relation to the cuneiform bones. There is also a comminuted fracture of the medial cuneiform. He subsequently underwent percutaneous pinning of the Lisfranc injury on 8-25-21. He has been nonweightbearing in the splint. He presents today for follow-up and scheduling for definitive fixation of the Lisfranc injury. He states that the pain and swelling has decreased in his right foot. Denies numbness, tingling or paresthesias.     Review of Systems   Constitutional: Negative for fever, chills, diaphoresis, appetite change and fatigue. HENT: Negative for dental issues, hearing loss and tinnitus. Negative for congestion, sinus pressure, sneezing, sore throat. Negative for headache. Eyes: Negative for visual disturbance, blurred and double vision. Negative for pain, discharge, redness and itching  Respiratory: Negative for cough, shortness of breath and wheezing. Cardiovascular: Negative for chest pain, palpitations and leg swelling. No dyspnea on exertion   Gastrointestinal:   Negative for nausea, vomiting, abdominal pain, diarrhea, constipation  or black or bloody. Hematologic\Lymphatic:  negative for bleeding, petechiae,   Genitourinary: Negative for hematuria and difficulty urinating. Musculoskeletal: Negative for neck pain and stiffness. Mild for back pain, negative joint swelling and gait problem. Skin: Negative for pallor, rash and wound.    Neurological: Negative for dizziness, tremors, dislocation, right, initial encounter  XR FOOT RIGHT (MIN 3 VIEWS)   2. Screening for viral disease  COVID-19 Ambulatory      Discussion:  Had lengthy discussion with patient regarding His diagnosis, typical prognosis, and expected outcomes. I reviewed the possible complications from the injury itself despite treatment choosen. I also discussed treatment options including nonoperative managements versus surgical management, along with risks and benefits of each. They have elected for operative management at this time.       Discussed with patient factors that can impact patient's fracture healing. Patient has the following risk factors for union: Nicotine Use, smokeless tobacco     Risk, benefits and treatment options discussed with Desirae Ray. he has verbalized understanding of options. The possibility of complications were also discussed to include but not limited to nerve damage, infection, problems with wound healing, vascular injury, chronic pain, stiffness, dysfunction, nonhealing of the bone, symptomatic hardware and/or its failure, need for subsequent surgery, dislocation, and blood clots as well as medical related problems and other problems not specifically discussed. Risk of anesthesia also discussed to include death.    Post-op care, work, activity and restrictions which included the use of pain medication and possibility of using blood thinner post op were also discussed with Desirae Ray and he verbalized and agreed with the restrictions.      PLAN:  Plan for OR on 9-15-21 with Dr. Gita Reynolds, DO for right foot Lisfranc injury ORIF  Pre-surgery medical clearance is not needed  NPO after midnight the night before surgery  NWB on right lower extremity  Splint care instructed with return precautions  Hold NSAIDS 7 days prior to surgery  Hold aspirin the morning of your surgery  Pre-op COVID-19 testing

## 2021-09-10 NOTE — PROGRESS NOTES
Orthopaedic H&P Note    Satinder Rivera is a 39 y.o. male, his YOB: 1975 with the [facility]:      Patient Active Problem List    Diagnosis Date Noted    Lisfranc dislocation, right, initial encounter 08/25/2021     Current Outpatient Medications   Medication Sig Dispense Refill    escitalopram (LEXAPRO) 10 MG tablet Take 10 mg by mouth daily      losartan (COZAAR) 50 MG tablet Take 50 mg by mouth daily      aspirin 325 MG EC tablet Take 1 tablet by mouth 2 times daily for 28 days 56 tablet 0    enalapril (VASOTEC) 10 MG tablet Take 10 mg by mouth 2 times daily       hydroCHLOROthiazide (HYDRODIURIL) 25 MG tablet Take 25 mg by mouth daily      hydrOXYzine (ATARAX) 25 MG tablet Take 25 mg by mouth 3 times daily as needed for Anxiety       tamsulosin (FLOMAX) 0.4 MG capsule Take 1 capsule by mouth nightly for 7 days 7 capsule 0     No current facility-administered medications for this visit. Allergies: Patient has no known allergies. Past Medical History:   Diagnosis Date    Diverticulitis     Hypertension      Past Surgical History:   Procedure Laterality Date    ANKLE FRACTURE SURGERY Right 8/25/2021    RIGHT FOOT LISFRANC  PERCUTANEOUS PINNING performed by Bret Becerra DO at [address]       No family history on file. Social History     Tobacco Use    Smoking status: Never Smoker    Smokeless tobacco: Current User     Types: Chew   Substance Use Topics    Alcohol use: Never                             Chief Complaint   Patient presents with    Follow-up     2wk post-op R lisfranc. Pain 0/10, denies numbness or tingling, fever or chills. Presents with splint and crutches. Admits to some WB for balance.  Other     XR in EPIC       SUBJECTIVE: Satinder Rivera is a 57-year-old male who sustained a right foot injury after a fall from approximately 20 feet.   He states that he was adjusting his tree stand when the stand dislodged from the tree and he began to fall. He noted immediate pain about the right foot and was unable to ambulate after the fall. He initially went to [facility] and was transferred to [address] [place] [address]. X-rays showed a Lisfranc fracture with dislocation of the first, second and third metatarsal bases in relation to the cuneiform bones. There is also a comminuted fracture of the medial cuneiform. He subsequently underwent percutaneous pinning of the Lisfranc injury on 8-25-21. He has been nonweightbearing in the splint. He presents today for follow-up and scheduling for definitive fixation of the Lisfranc injury. He states that the pain and swelling has decreased in his right foot. Denies numbness, tingling or paresthesias. Review of Systems   Constitutional: Negative for fever, chills, diaphoresis, appetite change and fatigue. HENT: Negative for dental issues, hearing loss and tinnitus. Negative for congestion, sinus pressure, sneezing, sore throat. Negative for headache. Eyes: Negative for visual disturbance, blurred and double vision. Negative for pain, discharge, redness and itching  Respiratory: Negative for cough, shortness of breath and wheezing. Cardiovascular: Negative for chest pain, palpitations and leg swelling. No dyspnea on exertion   Gastrointestinal:   Negative for nausea, vomiting, abdominal pain, diarrhea, constipation  or black or bloody. Hematologic\Lymphatic:  negative for bleeding, petechiae,   Genitourinary: Negative for hematuria and difficulty urinating. Musculoskeletal: Negative for neck pain and stiffness. Mild for back pain, negative joint swelling and gait problem. Skin: Negative for pallor, rash and wound. Neurological: Negative for dizziness, tremors, seizures, weakness, light-headedness, no TIA or stroke symptoms. No numbness and headaches. Psychiatric/Behavioral: Negative.      Physical Examination:   General appearance: alert, well appearing, and in no distress,  normal appearing weight  Mental status: alert, oriented to person, place, and time, normal mood, behavior, speech, dress, motor activity, and thought processes  Abdomen: soft, nondistended   Resp:   resp easy and unlabored, no audible wheezes note  Cardiac: distal pulses palpable, skin well perfused  Neurological: alert, oriented X3, normal speech, no focal findings or movement disorder noted, motor and sensory grossly normal bilaterally, normal muscle tone, no tremors, strength 5/5, normal gait and station  HEENT: normochephalic atraumatic, external ears and eyes normal, sclera normal, neck supple  Extremities:   peripheral pulses normal, no edema, redness or tenderness in the calves   Skin: normal coloration, no rashes or open wounds, no suspicious skin lesions noted  Psych: Affect euthymic   Musculoskeletal:    Extremity:  Right Lower Extremity  Skin clean dry and intact, without signs of infection  Pin sites are clean and dry. No erythema or drainage. Mild/moderate edema noted about the right foot area and toes  Compartments supple throughout thigh and leg  Calf supple and nontender  Demonstrates active knee flexion/extension, ankle plantar/dorsiflexion/great toe extension. States sensation intact to touch in sural/deep peroneal/superficial peroneal/saphenous/posterior tibial nerve distributions to foot/ankle. Palpable dorsalis pedis and posterior tibialis pulses, cap refill brisk in toes, foot warm/perfused. Temp 97.1 °F (36.2 °C)      XR: 9/10/21   3 views right foot demonstrate persistent widening of the first intermetatarsal and intertarsal space. Traversing K wires remain intact. There is no significant periosteal bone formation. ASSESSMENT:   Diagnosis Orders   1.  Lisfranc dislocation, right, initial encounter  XR FOOT RIGHT (MIN 3 VIEWS)   2. Screening for viral disease  COVID-19 Ambulatory     Discussion:  Had lengthy discussion with patient regarding His diagnosis, typical prognosis, and expected outcomes. I reviewed the possible complications from the injury itself despite treatment choosen. I also discussed treatment options including nonoperative managements versus surgical management, along with risks and benefits of each. They have elected for operative management at this time. Discussed with patient factors that can impact patient's fracture healing. Patient has the following risk factors for union: Nicotine Use, smokeless tobacco    Risk, benefits and treatment options discussed with Praveen Ta. he has verbalized understanding of options. The possibility of complications were also discussed to include but not limited to nerve damage, infection, problems with wound healing, vascular injury, chronic pain, stiffness, dysfunction, nonhealing of the bone, symptomatic hardware and/or its failure, need for subsequent surgery, dislocation, and blood clots as well as medical related problems and other problems not specifically discussed. Risk of anesthesia also discussed to include death. Post-op care, work, activity and restrictions which included the use of pain medication and possibility of using blood thinner post op were also discussed with Praveen Ta and he verbalized and agreed with the restrictions. PLAN:  Plan for OR on 9-15-21 with Dr. Monika Johnson, DO for right foot Lisfranc injury ORIF  Pre-surgery medical clearance is not needed  NPO after midnight the night before surgery  NWB on right lower extremity  Splint care instructed with return precautions  Hold NSAIDS 7 days prior to surgery  Hold aspirin the morning of your surgery  Pre-op COVID-19 testing    Electronically signed by LIAM Courtney on 9/10/2021 at 11:48 AM  Note: This report was completed using [employer] voiced recognition software. Every effort has been made to ensure accuracy; however, inadvertent computerized transcription errors may be present.

## 2021-09-10 NOTE — H&P
Orthopaedic H&P Note     Bryan Mccoy is a 39 y.o. male, his YOB: 1975 with the following history as recorded in [facility]:             Patient Active Problem List     Diagnosis Date Noted    Lisfranc dislocation, right, initial encounter 08/25/2021      Current Facility-Administered Medications          Current Outpatient Medications   Medication Sig Dispense Refill    escitalopram (LEXAPRO) 10 MG tablet Take 10 mg by mouth daily        losartan (COZAAR) 50 MG tablet Take 50 mg by mouth daily        aspirin 325 MG EC tablet Take 1 tablet by mouth 2 times daily for 28 days 56 tablet 0    enalapril (VASOTEC) 10 MG tablet Take 10 mg by mouth 2 times daily         hydroCHLOROthiazide (HYDRODIURIL) 25 MG tablet Take 25 mg by mouth daily        hydrOXYzine (ATARAX) 25 MG tablet Take 25 mg by mouth 3 times daily as needed for Anxiety         tamsulosin (FLOMAX) 0.4 MG capsule Take 1 capsule by mouth nightly for 7 days 7 capsule 0      No current facility-administered medications for this visit.         Allergies: Patient has no known allergies. Past Medical History        Past Medical History:   Diagnosis Date    Diverticulitis      Hypertension           Past Surgical History         Past Surgical History:   Procedure Laterality Date    ANKLE FRACTURE SURGERY Right 8/25/2021     RIGHT FOOT LISFRANC  PERCUTANEOUS PINNING performed by Chandler Seip, DO at [facility] OR    COLON SURGERY             Family History   No family history on file. Social History            Tobacco Use    Smoking status: Never Smoker    Smokeless tobacco: Current User       Types: Chew   Substance Use Topics    Alcohol use: Never                                    Chief Complaint   Patient presents with    Follow-up       2wk post-op R lisfranc. Pain 0/10, denies numbness or tingling, fever or chills. Presents with splint and crutches. Admits to some WB for balance.     Other       XR in EPIC       SUBJECTIVE: Ghazala Vanegas is a 51-year-old male who sustained a right foot injury after a fall from approximately 20 feet. He states that he was adjusting his tree stand when the stand dislodged from the tree and he began to fall. He noted immediate pain about the right foot and was unable to ambulate after the fall. He initially went to [facility] ED and was transferred to [facility]. X-rays showed a Lisfranc fracture with dislocation of the first, second and third metatarsal bases in relation to the cuneiform bones. There is also a comminuted fracture of the medial cuneiform. He subsequently underwent percutaneous pinning of the Lisfranc injury on 8-25-21. He has been nonweightbearing in the splint. He presents today for follow-up and scheduling for definitive fixation of the Lisfranc injury. He states that the pain and swelling has decreased in his right foot. Denies numbness, tingling or paresthesias.     Review of Systems   Constitutional: Negative for fever, chills, diaphoresis, appetite change and fatigue. HENT: Negative for dental issues, hearing loss and tinnitus. Negative for congestion, sinus pressure, sneezing, sore throat. Negative for headache. Eyes: Negative for visual disturbance, blurred and double vision. Negative for pain, discharge, redness and itching  Respiratory: Negative for cough, shortness of breath and wheezing. Cardiovascular: Negative for chest pain, palpitations and leg swelling. No dyspnea on exertion   Gastrointestinal:   Negative for nausea, vomiting, abdominal pain, diarrhea, constipation  or black or bloody. Hematologic\Lymphatic:  negative for bleeding, petechiae,   Genitourinary: Negative for hematuria and difficulty urinating. Musculoskeletal: Negative for neck pain and stiffness. Mild for back pain, negative joint swelling and gait problem. Skin: Negative for pallor, rash and wound.    Neurological: Negative for dizziness, tremors, seizures, weakness, light-headedness, no TIA or stroke symptoms. No numbness and headaches. Psychiatric/Behavioral: Negative.      Physical Examination:   General appearance: alert, well appearing, and in no distress,  normal appearing weight  Mental status: alert, oriented to person, place, and time, normal mood, behavior, speech, dress, motor activity, and thought processes  Abdomen: soft, nondistended   Resp:   resp easy and unlabored, no audible wheezes note  Cardiac: distal pulses palpable, skin well perfused  Neurological: alert, oriented X3, normal speech, no focal findings or movement disorder noted, motor and sensory grossly normal bilaterally, normal muscle tone, no tremors, strength 5/5, normal gait and station  HEENT: normochephalic atraumatic, external ears and eyes normal, sclera normal, neck supple  Extremities:   peripheral pulses normal, no edema, redness or tenderness in the calves   Skin: normal coloration, no rashes or open wounds, no suspicious skin lesions noted  Psych: Affect euthymic   Musculoskeletal:    Extremity:  Right Lower Extremity  Skin clean dry and intact, without signs of infection  Pin sites are clean and dry. No erythema or drainage. Mild/moderate edema noted about the right foot area and toes  Compartments supple throughout thigh and leg  Calf supple and nontender  Demonstrates active knee flexion/extension, ankle plantar/dorsiflexion/great toe extension. States sensation intact to touch in sural/deep peroneal/superficial peroneal/saphenous/posterior tibial nerve distributions to foot/ankle. Palpable dorsalis pedis and posterior tibialis pulses, cap refill brisk in toes, foot warm/perfused. Temp 97.1 °F (36.2 °C)      XR: 9/10/21   3 views right foot demonstrate persistent widening of the first intermetatarsal and intertarsal space. Traversing K wires remain intact. There is no significant periosteal bone formation.     ASSESSMENT:    Diagnosis Orders   1.  Susana dislocation, right, initial encounter  XR FOOT RIGHT (MIN 3 VIEWS)   2. Screening for viral disease  COVID-19 Ambulatory      Discussion:  Had lengthy discussion with patient regarding His diagnosis, typical prognosis, and expected outcomes. I reviewed the possible complications from the injury itself despite treatment choosen. I also discussed treatment options including nonoperative managements versus surgical management, along with risks and benefits of each. They have elected for operative management at this time.       Discussed with patient factors that can impact patient's fracture healing. Patient has the following risk factors for union: Nicotine Use, smokeless tobacco     Risk, benefits and treatment options discussed with Darren Jolly. he has verbalized understanding of options. The possibility of complications were also discussed to include but not limited to nerve damage, infection, problems with wound healing, vascular injury, chronic pain, stiffness, dysfunction, nonhealing of the bone, symptomatic hardware and/or its failure, need for subsequent surgery, dislocation, and blood clots as well as medical related problems and other problems not specifically discussed. Risk of anesthesia also discussed to include death.    Post-op care, work, activity and restrictions which included the use of pain medication and possibility of using blood thinner post op were also discussed with Darren Jolly and he verbalized and agreed with the restrictions.      PLAN:  Plan for OR on 9-15-21 with Dr. Hermelinda Valle, DO for right foot Lisfranc injury ORIF  Pre-surgery medical clearance is not needed  NPO after midnight the night before surgery  NWB on right lower extremity  Splint care instructed with return precautions  Hold NSAIDS 7 days prior to surgery  Hold aspirin the morning of your surgery  Pre-op COVID-19 testing

## 2021-09-11 DIAGNOSIS — Z11.59 SCREENING FOR VIRAL DISEASE: ICD-10-CM

## 2021-09-12 LAB
SARS-COV-2: NOT DETECTED
SOURCE: NORMAL

## 2021-09-13 NOTE — PROGRESS NOTES
[facility] 38 PRE-ADMISSION TESTING GENERAL INSTRUCTIONS- [facility]-phone number:[phone]    GENERAL INSTRUCTIONS  [x] Antibacterial Soap shower Night before and/or AM of Surgery    [x] Nothing by mouth after midnight, including gum, candy, mints, or water. [x] You may brush your teeth, gargle, but do NOT swallow water. [x]No smoking, chewing tobacco, illegal drugs, or alcohol within 24 hours of your surgery. [x] Jewelry, valuables or body piercing's should not be brought to the hospital. All body and/or tongue piercing's must be removed prior to arriving to hospital.  ALL hair pins must be removed. [x] Do not wear makeup, lotions, powders, deodorant. Nail polish as directed by the nurse. [x] Arrange transportation with a responsible adult  to and from the hospital. If you do not have a responsible adult  to transport you, you will need to make arrangements with a medical transportation company (i.e. [employer]. A Uber/taxi/bus is not appropriate unless you are accompanied by a responsible adult ). Arrange for someone to be with you for the remainder of the day and for 24 hours after your procedure due to having had anesthesia. Who will be your  for transportation? Alithia, spouse  Who will be staying with you for 24 hrs after your procedure? Alithia, spouse  [x] [employer] card and photo ID. PARKING INSTRUCTIONS:   [x] Arrival Time: 11:00 am, you and your  will need to wear a mask. · [x] Parking lot '\"I\"  is located on [facility] (the corner of [facility]). To enter, press the button and the gate will lift. A free token will be provided to exit the lot. One car per patient is allowed to park in this lot. All other cars are to park on [address] [place] [address] either in the parking garage or the handicap lot.       Walk up the front walk to the [facility], the door will be locked an employee will greet you and let you in. EDUCATION INSTRUCTIONS:         [x]Pain: Post-op pain is normal and to be expected. You will be asked to rate your pain from 0-10 (a zero is not acceptable-education is needed). Your post-op pain goal is  [x] Ask your nurse for your pain medication. MEDICATION INSTRUCTIONS:  [x]Bring a complete list of your medications, please write the last time you took the medicine, give this list to the nurse. [x] Take the following medications the morning of surgery with 1-2 ounces of water: escitalopram    May take hydroxyzine the morning of surgery if needed with a sip of water  [x] Stop herbal supplements, ibuprofen (Nsaids) and vitamins 5 days before your surgery. [x] Follow physician instructions regarding any blood thinners you may be taking. WHAT TO EXPECT:  [x] The day of surgery you will be greeted and checked in by the [employer]. Please bring your photo ID and insurance card. A nurse will greet you in accordance to the time you are needed in the pre-op area to prepare you for surgery. Please do not be discouraged if you are not greeted in the order you arrive as there are many variables that are involved in patient preparation. Your patience is greatly appreciated as you wait for your nurse. Please bring in items such as: books, magazines, newspapers, electronics, or any other items  to occupy your time in the waiting area. [x]  Delays may occur with surgery and staff will make a sincere effort to keep you informed of delays. If any delays occur with your procedure, we apologize ahead of time for your inconvenience as we recognize the value of your time.

## 2021-09-14 ENCOUNTER — ANESTHESIA EVENT (OUTPATIENT)
Dept: OPERATING ROOM | Age: 46
End: 2021-09-14
Payer: COMMERCIAL

## 2021-09-15 ENCOUNTER — APPOINTMENT (OUTPATIENT)
Dept: GENERAL RADIOLOGY | Age: 46
End: 2021-09-15
Attending: ORTHOPAEDIC SURGERY
Payer: COMMERCIAL

## 2021-09-15 ENCOUNTER — ANESTHESIA (OUTPATIENT)
Dept: OPERATING ROOM | Age: 46
End: 2021-09-15
Payer: COMMERCIAL

## 2021-09-15 ENCOUNTER — HOSPITAL ENCOUNTER (OUTPATIENT)
Age: 46
Setting detail: OUTPATIENT SURGERY
Discharge: HOME | End: 2021-09-15
Attending: ORTHOPAEDIC SURGERY | Admitting: ORTHOPAEDIC SURGERY
Payer: COMMERCIAL

## 2021-09-15 VITALS
OXYGEN SATURATION: 95 % | TEMPERATURE: 97.3 F | HEIGHT: 68 IN | DIASTOLIC BLOOD PRESSURE: 65 MMHG | RESPIRATION RATE: 20 BRPM | HEART RATE: 99 BPM | SYSTOLIC BLOOD PRESSURE: 113 MMHG | BODY MASS INDEX: 34.86 KG/M2 | WEIGHT: 230 LBS

## 2021-09-15 VITALS — OXYGEN SATURATION: 85 % | SYSTOLIC BLOOD PRESSURE: 114 MMHG | DIASTOLIC BLOOD PRESSURE: 53 MMHG

## 2021-09-15 DIAGNOSIS — Z01.818 PRE-OP TESTING: Primary | ICD-10-CM

## 2021-09-15 DIAGNOSIS — S93.324A LISFRANC DISLOCATION, RIGHT, INITIAL ENCOUNTER: ICD-10-CM

## 2021-09-15 PROCEDURE — 2580000003 HC RX 258: Performed by: PHYSICIAN ASSISTANT

## 2021-09-15 PROCEDURE — 73630 X-RAY EXAM OF FOOT: CPT

## 2021-09-15 PROCEDURE — 2580000003 HC RX 258

## 2021-09-15 PROCEDURE — 7100000010 HC PHASE II RECOVERY - FIRST 15 MIN: Performed by: ORTHOPAEDIC SURGERY

## 2021-09-15 PROCEDURE — 6360000002 HC RX W HCPCS

## 2021-09-15 PROCEDURE — 6370000000 HC RX 637 (ALT 250 FOR IP): Performed by: ORTHOPAEDIC SURGERY

## 2021-09-15 PROCEDURE — 2709999900 HC NON-CHARGEABLE SUPPLY: Performed by: ORTHOPAEDIC SURGERY

## 2021-09-15 PROCEDURE — 7100000000 HC PACU RECOVERY - FIRST 15 MIN: Performed by: ORTHOPAEDIC SURGERY

## 2021-09-15 PROCEDURE — 3209999900 FLUORO FOR SURGICAL PROCEDURES

## 2021-09-15 PROCEDURE — 3700000000 HC ANESTHESIA ATTENDED CARE: Performed by: ORTHOPAEDIC SURGERY

## 2021-09-15 PROCEDURE — 2500000003 HC RX 250 WO HCPCS

## 2021-09-15 PROCEDURE — 2720000010 HC SURG SUPPLY STERILE: Performed by: ORTHOPAEDIC SURGERY

## 2021-09-15 PROCEDURE — 28465 OPTX TARSAL BONE FX EACH: CPT | Performed by: ORTHOPAEDIC SURGERY

## 2021-09-15 PROCEDURE — 3700000001 HC ADD 15 MINUTES (ANESTHESIA): Performed by: ORTHOPAEDIC SURGERY

## 2021-09-15 PROCEDURE — 2500000003 HC RX 250 WO HCPCS: Performed by: ANESTHESIOLOGY

## 2021-09-15 PROCEDURE — 6360000002 HC RX W HCPCS: Performed by: ANESTHESIOLOGY

## 2021-09-15 PROCEDURE — 7100000011 HC PHASE II RECOVERY - ADDTL 15 MIN: Performed by: ORTHOPAEDIC SURGERY

## 2021-09-15 PROCEDURE — 7100000001 HC PACU RECOVERY - ADDTL 15 MIN: Performed by: ORTHOPAEDIC SURGERY

## 2021-09-15 PROCEDURE — 28615 REPAIR FOOT DISLOCATION: CPT | Performed by: ORTHOPAEDIC SURGERY

## 2021-09-15 PROCEDURE — 3600000002 HC SURGERY LEVEL 2 BASE: Performed by: ORTHOPAEDIC SURGERY

## 2021-09-15 PROCEDURE — 6360000002 HC RX W HCPCS: Performed by: PHYSICIAN ASSISTANT

## 2021-09-15 PROCEDURE — C1769 GUIDE WIRE: HCPCS | Performed by: ORTHOPAEDIC SURGERY

## 2021-09-15 PROCEDURE — C1713 ANCHOR/SCREW BN/BN,TIS/BN: HCPCS | Performed by: ORTHOPAEDIC SURGERY

## 2021-09-15 PROCEDURE — 3600000012 HC SURGERY LEVEL 2 ADDTL 15MIN: Performed by: ORTHOPAEDIC SURGERY

## 2021-09-15 DEVICE — SCREW BNE L40MM DIA4MM S STL CANN SHT 1/3 THRD SM HEX SOCK: Type: IMPLANTABLE DEVICE | Status: FUNCTIONAL

## 2021-09-15 DEVICE — SCREW BNE L30MM DIA2.7MM CORT S STL ST FULL THRD FOR SM: Type: IMPLANTABLE DEVICE | Site: FOOT | Status: FUNCTIONAL

## 2021-09-15 DEVICE — IMPLANTABLE DEVICE: Type: IMPLANTABLE DEVICE | Status: FUNCTIONAL

## 2021-09-15 DEVICE — SCREW BNE L36MM DIA3.5MM CORT S STL ST NONCANNULATED LOK: Type: IMPLANTABLE DEVICE | Site: FOOT | Status: FUNCTIONAL

## 2021-09-15 RX ORDER — OXYCODONE HYDROCHLORIDE AND ACETAMINOPHEN 5; 325 MG/1; MG/1
1 TABLET ORAL EVERY 6 HOURS PRN
Qty: 28 TABLET | Refills: 0 | Status: SHIPPED | OUTPATIENT
Start: 2021-09-15 | End: 2021-09-23 | Stop reason: SDUPTHER

## 2021-09-15 RX ORDER — FENTANYL CITRATE 50 UG/ML
INJECTION, SOLUTION INTRAMUSCULAR; INTRAVENOUS PRN
Status: DISCONTINUED | OUTPATIENT
Start: 2021-09-15 | End: 2021-09-15 | Stop reason: SDUPTHER

## 2021-09-15 RX ORDER — MIDAZOLAM HYDROCHLORIDE 1 MG/ML
INJECTION INTRAMUSCULAR; INTRAVENOUS PRN
Status: DISCONTINUED | OUTPATIENT
Start: 2021-09-15 | End: 2021-09-15 | Stop reason: SDUPTHER

## 2021-09-15 RX ORDER — SODIUM CHLORIDE, SODIUM LACTATE, POTASSIUM CHLORIDE, CALCIUM CHLORIDE 600; 310; 30; 20 MG/100ML; MG/100ML; MG/100ML; MG/100ML
INJECTION, SOLUTION INTRAVENOUS CONTINUOUS
Status: DISCONTINUED | OUTPATIENT
Start: 2021-09-15 | End: 2021-09-15 | Stop reason: HOSPADM

## 2021-09-15 RX ORDER — DIPHENHYDRAMINE HYDROCHLORIDE 50 MG/ML
12.5 INJECTION INTRAMUSCULAR; INTRAVENOUS
Status: DISCONTINUED | OUTPATIENT
Start: 2021-09-15 | End: 2021-09-15 | Stop reason: HOSPADM

## 2021-09-15 RX ORDER — OXYCODONE HYDROCHLORIDE AND ACETAMINOPHEN 5; 325 MG/1; MG/1
1 TABLET ORAL PRN
Status: DISCONTINUED | OUTPATIENT
Start: 2021-09-15 | End: 2021-09-15 | Stop reason: HOSPADM

## 2021-09-15 RX ORDER — SODIUM CHLORIDE 9 MG/ML
INJECTION, SOLUTION INTRAVENOUS CONTINUOUS PRN
Status: DISCONTINUED | OUTPATIENT
Start: 2021-09-15 | End: 2021-09-15

## 2021-09-15 RX ORDER — ROCURONIUM BROMIDE 10 MG/ML
INJECTION, SOLUTION INTRAVENOUS PRN
Status: DISCONTINUED | OUTPATIENT
Start: 2021-09-15 | End: 2021-09-15 | Stop reason: SDUPTHER

## 2021-09-15 RX ORDER — DEXAMETHASONE SODIUM PHOSPHATE 10 MG/ML
INJECTION INTRAMUSCULAR; INTRAVENOUS PRN
Status: DISCONTINUED | OUTPATIENT
Start: 2021-09-15 | End: 2021-09-15 | Stop reason: SDUPTHER

## 2021-09-15 RX ORDER — MEPERIDINE HYDROCHLORIDE 25 MG/ML
12.5 INJECTION INTRAMUSCULAR; INTRAVENOUS; SUBCUTANEOUS EVERY 5 MIN PRN
Status: DISCONTINUED | OUTPATIENT
Start: 2021-09-15 | End: 2021-09-15 | Stop reason: HOSPADM

## 2021-09-15 RX ORDER — SODIUM CHLORIDE 9 MG/ML
25 INJECTION, SOLUTION INTRAVENOUS PRN
Status: DISCONTINUED | OUTPATIENT
Start: 2021-09-15 | End: 2021-09-15 | Stop reason: HOSPADM

## 2021-09-15 RX ORDER — SODIUM CHLORIDE 0.9 % (FLUSH) 0.9 %
10 SYRINGE (ML) INJECTION EVERY 12 HOURS SCHEDULED
Status: DISCONTINUED | OUTPATIENT
Start: 2021-09-15 | End: 2021-09-15 | Stop reason: HOSPADM

## 2021-09-15 RX ORDER — LIDOCAINE HYDROCHLORIDE 20 MG/ML
INJECTION, SOLUTION INTRAVENOUS PRN
Status: DISCONTINUED | OUTPATIENT
Start: 2021-09-15 | End: 2021-09-15 | Stop reason: SDUPTHER

## 2021-09-15 RX ORDER — DIAPER,BRIEF,INFANT-TODD,DISP
EACH MISCELLANEOUS PRN
Status: DISCONTINUED | OUTPATIENT
Start: 2021-09-15 | End: 2021-09-15 | Stop reason: ALTCHOICE

## 2021-09-15 RX ORDER — PROPOFOL 10 MG/ML
INJECTION, EMULSION INTRAVENOUS PRN
Status: DISCONTINUED | OUTPATIENT
Start: 2021-09-15 | End: 2021-09-15 | Stop reason: SDUPTHER

## 2021-09-15 RX ORDER — SODIUM CHLORIDE, SODIUM LACTATE, POTASSIUM CHLORIDE, CALCIUM CHLORIDE 600; 310; 30; 20 MG/100ML; MG/100ML; MG/100ML; MG/100ML
INJECTION, SOLUTION INTRAVENOUS CONTINUOUS PRN
Status: DISCONTINUED | OUTPATIENT
Start: 2021-09-15 | End: 2021-09-15 | Stop reason: SDUPTHER

## 2021-09-15 RX ORDER — MIDAZOLAM HYDROCHLORIDE 2 MG/2ML
1 INJECTION, SOLUTION INTRAMUSCULAR; INTRAVENOUS EVERY 5 MIN PRN
Status: DISCONTINUED | OUTPATIENT
Start: 2021-09-15 | End: 2021-09-15 | Stop reason: HOSPADM

## 2021-09-15 RX ORDER — SODIUM CHLORIDE 0.9 % (FLUSH) 0.9 %
10 SYRINGE (ML) INJECTION PRN
Status: DISCONTINUED | OUTPATIENT
Start: 2021-09-15 | End: 2021-09-15 | Stop reason: HOSPADM

## 2021-09-15 RX ORDER — ONDANSETRON 2 MG/ML
INJECTION INTRAMUSCULAR; INTRAVENOUS PRN
Status: DISCONTINUED | OUTPATIENT
Start: 2021-09-15 | End: 2021-09-15 | Stop reason: SDUPTHER

## 2021-09-15 RX ORDER — ROPIVACAINE HYDROCHLORIDE 5 MG/ML
30 INJECTION, SOLUTION EPIDURAL; INFILTRATION; PERINEURAL
Status: COMPLETED | OUTPATIENT
Start: 2021-09-15 | End: 2021-09-15

## 2021-09-15 RX ORDER — EPHEDRINE SULFATE/0.9% NACL/PF 50 MG/5 ML
SYRINGE (ML) INTRAVENOUS PRN
Status: DISCONTINUED | OUTPATIENT
Start: 2021-09-15 | End: 2021-09-15 | Stop reason: SDUPTHER

## 2021-09-15 RX ORDER — OXYCODONE HYDROCHLORIDE AND ACETAMINOPHEN 5; 325 MG/1; MG/1
2 TABLET ORAL PRN
Status: DISCONTINUED | OUTPATIENT
Start: 2021-09-15 | End: 2021-09-15 | Stop reason: HOSPADM

## 2021-09-15 RX ORDER — LIDOCAINE HYDROCHLORIDE 10 MG/ML
10 INJECTION, SOLUTION INFILTRATION; PERINEURAL
Status: COMPLETED | OUTPATIENT
Start: 2021-09-15 | End: 2021-09-15

## 2021-09-15 RX ORDER — DEXAMETHASONE SODIUM PHOSPHATE 10 MG/ML
4 INJECTION, SOLUTION INTRAMUSCULAR; INTRAVENOUS ONCE
Status: COMPLETED | OUTPATIENT
Start: 2021-09-15 | End: 2021-09-15

## 2021-09-15 RX ORDER — OXYCODONE HYDROCHLORIDE AND ACETAMINOPHEN 5; 325 MG/1; MG/1
1 TABLET ORAL EVERY 6 HOURS PRN
Qty: 28 TABLET | Refills: 0 | Status: SHIPPED | OUTPATIENT
Start: 2021-09-15 | End: 2021-09-15 | Stop reason: SDUPTHER

## 2021-09-15 RX ORDER — GLYCOPYRROLATE 1 MG/5 ML
SYRINGE (ML) INTRAVENOUS PRN
Status: DISCONTINUED | OUTPATIENT
Start: 2021-09-15 | End: 2021-09-15 | Stop reason: SDUPTHER

## 2021-09-15 RX ADMIN — MIDAZOLAM 2 MG: 1 INJECTION INTRAMUSCULAR; INTRAVENOUS at 13:30

## 2021-09-15 RX ADMIN — LIDOCAINE HYDROCHLORIDE 100 MG: 20 INJECTION, SOLUTION INTRAVENOUS at 13:44

## 2021-09-15 RX ADMIN — ROPIVACAINE HYDROCHLORIDE 30 ML: 5 INJECTION, SOLUTION EPIDURAL; INFILTRATION; PERINEURAL at 13:28

## 2021-09-15 RX ADMIN — FENTANYL CITRATE 50 MCG: 50 INJECTION, SOLUTION INTRAMUSCULAR; INTRAVENOUS at 15:01

## 2021-09-15 RX ADMIN — SODIUM CHLORIDE, POTASSIUM CHLORIDE, SODIUM LACTATE AND CALCIUM CHLORIDE: 600; 310; 30; 20 INJECTION, SOLUTION INTRAVENOUS at 13:30

## 2021-09-15 RX ADMIN — FENTANYL CITRATE 50 MCG: 50 INJECTION, SOLUTION INTRAMUSCULAR; INTRAVENOUS at 15:17

## 2021-09-15 RX ADMIN — ONDANSETRON HYDROCHLORIDE 4 MG: 2 INJECTION, SOLUTION INTRAMUSCULAR; INTRAVENOUS at 15:20

## 2021-09-15 RX ADMIN — FENTANYL CITRATE 100 MCG: 50 INJECTION, SOLUTION INTRAMUSCULAR; INTRAVENOUS at 13:44

## 2021-09-15 RX ADMIN — DEXAMETHASONE SODIUM PHOSPHATE 4 MG: 10 INJECTION, SOLUTION INTRAMUSCULAR; INTRAVENOUS at 13:28

## 2021-09-15 RX ADMIN — Medication 5 MG: at 14:10

## 2021-09-15 RX ADMIN — Medication 2000 MG: at 13:54

## 2021-09-15 RX ADMIN — ROCURONIUM BROMIDE 10 MG: 10 INJECTION, SOLUTION INTRAVENOUS at 14:11

## 2021-09-15 RX ADMIN — PROPOFOL 50 MG: 10 INJECTION, EMULSION INTRAVENOUS at 14:35

## 2021-09-15 RX ADMIN — Medication 0.2 MG: at 14:04

## 2021-09-15 RX ADMIN — PROPOFOL 200 MG: 10 INJECTION, EMULSION INTRAVENOUS at 13:44

## 2021-09-15 RX ADMIN — SODIUM CHLORIDE, POTASSIUM CHLORIDE, SODIUM LACTATE AND CALCIUM CHLORIDE: 600; 310; 30; 20 INJECTION, SOLUTION INTRAVENOUS at 14:27

## 2021-09-15 RX ADMIN — LIDOCAINE HYDROCHLORIDE 10 ML: 10 INJECTION, SOLUTION INFILTRATION; PERINEURAL at 13:26

## 2021-09-15 RX ADMIN — DEXAMETHASONE SODIUM PHOSPHATE 10 MG: 10 INJECTION INTRAMUSCULAR; INTRAVENOUS at 13:44

## 2021-09-15 RX ADMIN — MEPERIDINE HYDROCHLORIDE 12.5 MG: 25 INJECTION, SOLUTION INTRAMUSCULAR; INTRAVENOUS; SUBCUTANEOUS at 15:50

## 2021-09-15 RX ADMIN — FENTANYL CITRATE 50 MCG: 50 INJECTION, SOLUTION INTRAMUSCULAR; INTRAVENOUS at 14:35

## 2021-09-15 RX ADMIN — SODIUM CHLORIDE, POTASSIUM CHLORIDE, SODIUM LACTATE AND CALCIUM CHLORIDE: 600; 310; 30; 20 INJECTION, SOLUTION INTRAVENOUS at 11:40

## 2021-09-15 ASSESSMENT — PULMONARY FUNCTION TESTS
PIF_VALUE: 21
PIF_VALUE: 20
PIF_VALUE: 21
PIF_VALUE: 21
PIF_VALUE: 1
PIF_VALUE: 18
PIF_VALUE: 21
PIF_VALUE: 12
PIF_VALUE: 2
PIF_VALUE: 20
PIF_VALUE: 22
PIF_VALUE: 21
PIF_VALUE: 19
PIF_VALUE: 22
PIF_VALUE: 15
PIF_VALUE: 20
PIF_VALUE: 20
PIF_VALUE: 22
PIF_VALUE: 19
PIF_VALUE: 22
PIF_VALUE: 20
PIF_VALUE: 22
PIF_VALUE: 0
PIF_VALUE: 22
PIF_VALUE: 22
PIF_VALUE: 19
PIF_VALUE: 21
PIF_VALUE: 19
PIF_VALUE: 0
PIF_VALUE: 21
PIF_VALUE: 20
PIF_VALUE: 20
PIF_VALUE: 19
PIF_VALUE: 19
PIF_VALUE: 1
PIF_VALUE: 22
PIF_VALUE: 20
PIF_VALUE: 19
PIF_VALUE: 21
PIF_VALUE: 19
PIF_VALUE: 21
PIF_VALUE: 5
PIF_VALUE: 21
PIF_VALUE: 21
PIF_VALUE: 22
PIF_VALUE: 19
PIF_VALUE: 19
PIF_VALUE: 3
PIF_VALUE: 21
PIF_VALUE: 21
PIF_VALUE: 13
PIF_VALUE: 21
PIF_VALUE: 19
PIF_VALUE: 6
PIF_VALUE: 22
PIF_VALUE: 21
PIF_VALUE: 22
PIF_VALUE: 2
PIF_VALUE: 12
PIF_VALUE: 14
PIF_VALUE: 22
PIF_VALUE: 21
PIF_VALUE: 19
PIF_VALUE: 23
PIF_VALUE: 22
PIF_VALUE: 19
PIF_VALUE: 2
PIF_VALUE: 21
PIF_VALUE: 18
PIF_VALUE: 20
PIF_VALUE: 21
PIF_VALUE: 22
PIF_VALUE: 15
PIF_VALUE: 22
PIF_VALUE: 2
PIF_VALUE: 22
PIF_VALUE: 21
PIF_VALUE: 21
PIF_VALUE: 20
PIF_VALUE: 2
PIF_VALUE: 20
PIF_VALUE: 21
PIF_VALUE: 19
PIF_VALUE: 21
PIF_VALUE: 20
PIF_VALUE: 22
PIF_VALUE: 22
PIF_VALUE: 2
PIF_VALUE: 22
PIF_VALUE: 21
PIF_VALUE: 1
PIF_VALUE: 21
PIF_VALUE: 22
PIF_VALUE: 2
PIF_VALUE: 21
PIF_VALUE: 22
PIF_VALUE: 19
PIF_VALUE: 12
PIF_VALUE: 1
PIF_VALUE: 14
PIF_VALUE: 19
PIF_VALUE: 22
PIF_VALUE: 21
PIF_VALUE: 22
PIF_VALUE: 21
PIF_VALUE: 20
PIF_VALUE: 22
PIF_VALUE: 13
PIF_VALUE: 20
PIF_VALUE: 17
PIF_VALUE: 22

## 2021-09-15 ASSESSMENT — PAIN SCALES - GENERAL
PAINLEVEL_OUTOF10: 0

## 2021-09-15 ASSESSMENT — LIFESTYLE VARIABLES: SMOKING_STATUS: 0

## 2021-09-15 ASSESSMENT — PAIN - FUNCTIONAL ASSESSMENT: PAIN_FUNCTIONAL_ASSESSMENT: 0-10

## 2021-09-15 NOTE — INTERVAL H&P NOTE
Update History & Physical    The patient's History and Physical of September 10, 2021 was reviewed with the patient and I examined the patient. There was no change. The surgical site was confirmed by the patient and me. Plan:   Right lisfranc fracture dislocations open reductions with internal fixation    I have explained the risks and complications of the recommended surgery with the patient at length, as well as discussed potential treatment alternatives including nonoperative management. These risks include but are not limited to death or complication from anesthesia, continued pain, nerve tendon or vascular injury, infection, nonunion or malunion, stiffness, symptomatic hardware or hardware failure, deep vein thrombosis or pulmonary embolism, unforeseen complications, and need for further surgery, etc.  Patient understood this, asked appropriate questions, which were all answered, and he has elected to proceed with the procedure.       Electronically signed by Isi Sanford DO on 9/15/2021 at 12:03 PM

## 2021-09-15 NOTE — ANESTHESIA PRE PROCEDURE
Department of Anesthesiology  Preprocedure Note       Name:  Eddie Van   Age:  39 y.o.  :  1975                                          MRN:  [id number]         Date:  9/15/2021      Surgeon: Matilde Richard):  Kathleen Archibald DO    Procedure: Procedure(s):  RIGHT FOOT LISFRANC OPEN REDUCTION INTERNAL FIXATION---SYNTHES    Medications prior to admission:   Prior to Admission medications    Medication Sig Start Date End Date Taking? Authorizing Provider   Misc. Devices MISC 1 each by Does not apply route daily I-walk, nonweightbearing on the right lower extremity   [address] 9/10/21   Fritz Laureano PA-C   escitalopram (LEXAPRO) 10 MG tablet Take 10 mg by mouth daily    Historical Provider, MD   losartan (COZAAR) 50 MG tablet Take 50 mg by mouth daily    Historical Provider, MD   aspirin 325 MG EC tablet Take 1 tablet by mouth 2 times daily for 28 days 21  Ted Plietez DO   hydroCHLOROthiazide (HYDRODIURIL) 25 MG tablet Take 25 mg by mouth daily    Historical Provider, MD   hydrOXYzine (ATARAX) 25 MG tablet Take 25 mg by mouth 3 times daily as needed for Anxiety     Historical Provider, MD       Current medications:    No current facility-administered medications for this visit. No current outpatient medications on file.      Facility-Administered Medications Ordered in Other Visits   Medication Dose Route Frequency Provider Last Rate Last Admin    0.9 % sodium chloride infusion  25 mL IntraVENous PRN Juliette Laureano PA-C        ceFAZolin (ANCEF) 2000 mg in sterile water 20 mL IV syringe  2,000 mg IntraVENous On Call to [address] Saab PA-C        lactated ringers infusion   IntraVENous Continuous Juliette Laureano PA-C 125 mL/hr at 09/15/21 1140 New Bag at 09/15/21 1140    sodium chloride flush 0.9 % injection 10 mL  10 mL IntraVENous 2 times per day Juliette Laureano PA-C        sodium chloride flush 0.9 % injection 10 mL  10 mL IntraVENous PRN Juliette Laureano PA-C           Allergies:  No Known Allergies    Problem List:    Patient Active Problem List   Diagnosis Code    Lisfranc dislocation, right, initial encounter S80.80A       Past Medical History:        Diagnosis Date    Diverticulitis     Hypertension        Past Surgical History:        Procedure Laterality Date    ANKLE FRACTURE SURGERY Right 8/25/2021    RIGHT FOOT LISFRANC  PERCUTANEOUS PINNING performed by Naomi Betancourt DO at [facility]ERY         Social History:    Social History     Tobacco Use    Smoking status: Never Smoker    Smokeless tobacco: Current User     Types: Chew   Substance Use Topics    Alcohol use: Not Currently                                Ready to quit: Not Answered  Counseling given: Not Answered      Vital Signs (Current): There were no vitals filed for this visit.                                            BP Readings from Last 3 Encounters:   09/15/21 138/84   08/26/21 126/71   08/25/21 (!) 149/88       NPO Status:    Last solid and liquid consumption at 0900 on 8/24/21                                                                             BMI:   Wt Readings from Last 3 Encounters:   09/15/21 230 lb (104.3 kg)   08/24/21 237 lb (107.5 kg)   08/24/21 237 lb (107.5 kg)     There is no height or weight on file to calculate BMI.    CBC:   Lab Results   Component Value Date    WBC 10.2 08/24/2021    RBC 5.32 08/24/2021    HGB 16.3 08/24/2021    HCT 44.5 08/24/2021    MCV 83.6 08/24/2021    RDW 12.4 08/24/2021     08/24/2021       CMP:   Lab Results   Component Value Date     08/24/2021    K 3.4 08/24/2021    CL 98 08/24/2021    CO2 26 08/24/2021    BUN 16 08/24/2021    CREATININE 1.1 08/24/2021    GFRAA >60 08/24/2021    LABGLOM >60 08/24/2021    GLUCOSE 144 08/24/2021    PROT 7.4 07/30/2020    CALCIUM 9.5 08/24/2021    BILITOT 0.6 07/30/2020    ALKPHOS 80 07/30/2020    AST 23 07/30/2020    ALT 49 07/30/2020       POC Tests: No results for input(s): POCGLU, POCNA, POCK, POCCL, POCBUN, POCHEMO, POCHCT in the last 72 hours. Coags:   Lab Results   Component Value Date    PROTIME 12.5 08/25/2021    INR 1.1 08/25/2021    APTT 27.5 08/25/2021       HCG (If Applicable): No results found for: PREGTESTUR, PREGSERUM, HCG, HCGQUANT     ABGs: No results found for: PHART, PO2ART, GNW5PUB, YJR2IKS, BEART, I3HHCIKO     Type & Screen (If Applicable):  No results found for: LABABO, LABRH    Drug/Infectious Status (If Applicable):  No results found for: HIV, HEPCAB    COVID-19 Screening (If Applicable):   Lab Results   Component Value Date    COVID19 Not Detected 09/10/2021           Anesthesia Evaluation  Patient summary reviewed and Nursing notes reviewed no history of anesthetic complications:   Airway: Mallampati: III  TM distance: <3 FB   Neck ROM: full  Mouth opening: > = 3 FB Dental:      Comment: Multiple chipped teeth. Denies loose teeth. Pulmonary:Negative Pulmonary ROS breath sounds clear to auscultation      (-) not a current smoker          Patient did not smoke on day of surgery. Cardiovascular:  Exercise tolerance: good (>4 METS),   (+) hypertension:,         Rhythm: regular  Rate: normal           Beta Blocker:  Not on Beta Blocker         Neuro/Psych:   Negative Neuro/Psych ROS              GI/Hepatic/Renal:            ROS comment: Diverticulitis. Endo/Other:                      ROS comment: obese Abdominal:             Vascular: negative vascular ROS. Other Findings:               Anesthesia Plan      general and regional     ASA 2     (GA with popliteal fossa block  #5 LMA)  Induction: intravenous. MIPS: Postoperative opioids intended and Prophylactic antiemetics administered. Anesthetic plan and risks discussed with patient. Plan discussed with CRNA.                   Andreas Guzman MD   9/15/2021

## 2021-09-15 NOTE — OP NOTE
Operative Note      Patient: Bryan Mccoy  YOB: 1975  MRN: [id number]    Date of Procedure: 9/15/2021    Pre-Op Diagnosis:   Right medial cuneiform fracture   Right Lisfranc tarsometatarsal joint fracture dislocation  Right second tarsometatarsal joint dislocation    Post-Op Diagnosis: Same       Procedure(s):  Right medial cuneiform fracture open reduction with internal fixation  Right Lisfranc tarsometatarsal joint open reduction with internal fixation  Right second tarsometatarsal joint open reduction with internal fixation    Surgeon(s):  Chandler Seip, DO    Assistant:   Resident: Heraclio Dinero DO; Marino Kehr, DO; Debi Ruth DO    Anesthesia: General    Estimated Blood Loss (mL): Minimal    Complications: None    Specimens:   * No specimens in log *    Implants:  Implant Name Type Inv.  Item Serial No.  Lot No. LRB No. Used Action   SCREW BNE L36MM DIA3.5MM ANDRES S STL ST NONCANNULATED HONORIO  SCREW BNE L36MM DIA3.5MM ANDRES S STL ST NONCANNULATED HONORIO  DEPUY [employer]-WD  Right 1 Implanted   SCREW BNE L30MM DIA2.7MM ANDRES S STL ST FULL THRD FOR SM  SCREW BNE L30MM DIA2.7MM ANDRES S STL ST FULL THRD FOR SM  DEPUY [employer]-WD  Right 1 Implanted   SCREW BNE L40MM DIA4MM S STL KEN SHT 1/3 THRD SM HEX SOCK  SCREW BNE L40MM DIA4MM S STL KEN SHT 1/3 THRD SM HEX SOCK  DEPUY [employer]WD  Right 1 Implanted   SCREW BNE L52MM DIA4MM S STL KEN SHT 1/3 THRD SM HEX SOCK  SCREW BNE L52MM DIA4MM S STL KEN SHT 1/3 THRD SM HEX SOCK  DEPUY [employer]-WD  Right 1 Implanted   SCREW BNE L40MM DIA4MM S STL KEN SHT 1/3 THRD SM HEX SOCK  SCREW BNE L40MM DIA4MM S STL KEN SHT 1/3 THRD SM HEX SOCK  DEPUY [employer]WD  Right 1 Implanted   SCREW BNE L50MM DIA4.5MM S STL ST SELF DRL KEN PARTIALLY  SCREW BNE L50MM DIA4.5MM S STL ST SELF DRL KEN PARTIALLY  DEPUY [employer]-WD  Right 1 Implanted         Drains:   [REMOVED] Urethral Catheter Coude (Removed)   Catheter Indications Urinary retention (acute or chronic), continuous bladder irrigation or bladder outlet obstruction 08/26/21 1222   Urine Color Yellow 08/26/21 1222   Urine Appearance Clear 08/26/21 1222       Detailed Description of Procedure:   Patient brought to the operating suite where he received a general anesthetic by the department anesthesia as well 2 g of Ancef intravenously. The right lower extremity sterilely prepped and draped in standard sterile fashion. Surgical timeout was performed per protocol by all members of surgical team.  Procedure first began with localization of her surgical incision fluoroscopic guidance. A more medial based incision was made over the midfoot about the medial navicular. Longitudinal incision made the scalpel then blunt dissection taken down through the subcutaneous tissues down to the investing periosteum. The fracture line was then evidenct of the medial cuneiform, this was coronal plane in nature there was some extension daughter line distally into the articular surface. Main fracture site was not debrided removing interposed soft tissue and hematoma. This was an open reduced point reduction clamps. This was then stabilized with 2.7 mm lag screw in standard AO technique from a dorsal to plantar trajectory. Next dissection was taken more lateral allowing us to visualize the Lisfranc joint of the second metatarsal and medial cuneiform. Interposed soft tissue was swept out of the joint line this was then openly reduced with pointed reduction clamps. This was then stabilized with a cannulated screw under fluoroscopic guidance which was partially threaded. Screw was started slightly more dorsal due to the main fracture line in the medial cuneiform. We felt adjunctive second tarsometatarsal joint stabilization was also indicated.   A separate incision was then made over the second metatarsal shaft blunt dissection was taken down to the dorsal cortex a cannulated screw was then placed in a retrograde fashion across the second tarsometatarsal joint with it being first openly reduced. The initial screw did not have significant purchase therefore we upsized the cannulated screw which then did have adequate purchase. Fluoroscopic imaging confirmed appropriate reduction as well as hardware placement. This point time we felt we had appropriate stabilization of the medial column and the second and third tarsometatarsal joints. The fourth and fifth still had appropriate alignment there was a retained fifth tarsometatarsal joint pain which is stabilizing the lateral column. We do not feel any other adjunctive fixation was indicated. Final images were taken and saved to [employer] system. Wounds were thoroughly irrigated with saline solution. Standard layered closure was utilized. Well-padded short leg splint was then applied over sterile dressings. Patient was then awoken uneventfully from anesthetic transferred onto the [facility] to the postanesthesia care in stable condition. Postoperative plans:  Patient will be discharged home today as this was scheduled as an outpatient procedure. He received a prescription for pain control he is to use aspirin for DVT prophylaxis. Is to maintain a splint elevation nonweightbearing affected extremity. Follow-up in office in 2 weeks for repeat evaluation or sooner if needed. Electronically signed by Dorothy Meehan DO on 9/15/2021     NOTE: This report was transcribed using voice recognition software.  Every effort was made to ensure accuracy; however, inadvertent computerized transcription errors may be present

## 2021-09-15 NOTE — BRIEF OP NOTE
Brief Postoperative Note      Patient: Georgina Gustafson  YOB: 1975  MRN: [id number]    Date of Procedure: 9/15/2021    Pre-Op Diagnosis: RIGHT FOOT LISFRANC INJURY    Post-Op Diagnosis: Same       Procedure(s):  RIGHT FOOT LISFRANC OPEN REDUCTION INTERNAL FIXATION---SYNTHES    Surgeon(s):  Guerda Alexander DO    Assistant:  Resident: Sunita Serrano DO; Sherl Nissen, DO; Sujatha Ramos DO    Anesthesia: General    Estimated Blood Loss (mL): Minimal    Complications: None    Specimens:   * No specimens in log *    Implants:  Implant Name Type Inv.  Item Serial No.  Lot No. LRB No. Used Action   SCREW BNE L36MM DIA3.5MM ANDRES S STL ST NONCANNULATED HONORIO  SCREW BNE L36MM DIA3.5MM ANDRES S STL ST NONCANNULATED HONORIO  [employer]  Right 1 Implanted   SCREW BNE L30MM DIA2.7MM ANDRES S STL ST FULL THRD FOR SM  SCREW BNE L30MM DIA2.7MM ANDRES S STL ST FULL THRD FOR SM  DEPUY [employer]-WD  Right 1 Implanted   SCREW BNE L40MM DIA4MM S STL KEN SHT 1/3 THRD SM HEX SOCK  SCREW BNE L40MM DIA4MM S STL KEN SHT 1/3 THRD SM HEX SOCK  DEPUY [employer]-WD  Right 1 Implanted   SCREW BNE L52MM DIA4MM S STL KEN SHT 1/3 THRD SM HEX SOCK  SCREW BNE L52MM DIA4MM S STL KEN SHT 1/3 THRD SM HEX SOCK  DEPUY [employer]  Right 1 Implanted   SCREW BNE L40MM DIA4MM S STL KEN SHT 1/3 THRD SM HEX SOCK  SCREW BNE L40MM DIA4MM S STL KEN SHT 1/3 THRD SM HEX SOCK  DEPUY [employer]  Right 1 Implanted   SCREW BNE L50MM DIA4.5MM S STL ST SELF DRL KEN PARTIALLY  SCREW BNE L50MM DIA4.5MM S STL ST SELF DRL KEN PARTIALLY  DEPUY [employer]-WD  Right 1 Implanted         Drains:   [REMOVED] Urethral Catheter Coude (Removed)   Catheter Indications Urinary retention (acute or chronic), continuous bladder irrigation or bladder outlet obstruction 08/26/21 1222   Urine Color Yellow 08/26/21 1222   Urine Appearance Clear 08/26/21 1222       Findings: See operative dictation    Electronically signed by Jami Mcgowan Petra Downey, DO on 9/15/2021 at 3:41 PM

## 2021-09-15 NOTE — PROGRESS NOTES
Discharged instructions given and patient verbalizes understanding.   Discharged via wheel chair to home with family

## 2021-09-16 NOTE — ANESTHESIA POSTPROCEDURE EVALUATION
Department of Anesthesiology  Postprocedure Note    Patient: Praveen Ta  MRN: [id number]  YOB: 1975  Date of evaluation: 9/16/2021  Time:  6:27 AM     Procedure Summary     Date: 09/15/21 Room / Location: [facility] 09 / [facility]    Anesthesia Start: [address] Anesthesia Stop: 5528    Procedure: RIGHT FOOT LISFRANC OPEN REDUCTION INTERNAL FIXATION---SYNTHES (Right Foot) Diagnosis: (RIGHT FOOT LISFRANC INJURY)    Surgeons: Cameron Casper DO Responsible Provider: Rebecca Beach MD    Anesthesia Type: general, regional ASA Status: 2          Anesthesia Type: general, regional    Joni Phase I: Joni Score: 10    Joni Phase II: Joni Score: 10    Last vitals: Reviewed and per EMR flowsheets.        Anesthesia Post Evaluation    Patient location during evaluation: PACU  Patient participation: complete - patient participated  Level of consciousness: awake and alert  Airway patency: patent  Nausea & Vomiting: no nausea and no vomiting  Complications: no  Cardiovascular status: hemodynamically stable  Respiratory status: acceptable  Hydration status: euvolemic

## 2021-09-23 DIAGNOSIS — S93.324A LISFRANC DISLOCATION, RIGHT, INITIAL ENCOUNTER: Primary | ICD-10-CM

## 2021-09-23 DIAGNOSIS — S93.324A LISFRANC DISLOCATION, RIGHT, INITIAL ENCOUNTER: ICD-10-CM

## 2021-09-23 NOTE — TELEPHONE ENCOUNTER
Pt left VM requesting refill of Percocet. Date of Procedure: 9/15/2021  Procedure(s):  Right medial cuneiform fracture open reduction with internal fixation  Right Lisfranc tarsometatarsal joint open reduction with internal fixation  Right second tarsometatarsal joint open reduction with internal fixation    Order pended and routed for decision and signature.

## 2021-09-24 RX ORDER — OXYCODONE HYDROCHLORIDE AND ACETAMINOPHEN 5; 325 MG/1; MG/1
1 TABLET ORAL EVERY 8 HOURS PRN
Qty: 21 TABLET | Refills: 0 | Status: SHIPPED | OUTPATIENT
Start: 2021-09-24 | End: 2021-10-01

## 2021-09-28 ENCOUNTER — HOSPITAL ENCOUNTER (OUTPATIENT)
Dept: GENERAL RADIOLOGY | Age: 46
Discharge: HOME | End: 2021-09-30
Payer: COMMERCIAL

## 2021-09-28 ENCOUNTER — OFFICE VISIT (OUTPATIENT)
Dept: ORTHOPEDIC SURGERY | Age: 46
End: 2021-09-28
Payer: COMMERCIAL

## 2021-09-28 VITALS — TEMPERATURE: 98.5 F

## 2021-09-28 DIAGNOSIS — S93.324D LISFRANC DISLOCATION, RIGHT, SUBSEQUENT ENCOUNTER: Primary | ICD-10-CM

## 2021-09-28 DIAGNOSIS — S93.324A LISFRANC DISLOCATION, RIGHT, INITIAL ENCOUNTER: ICD-10-CM

## 2021-09-28 PROCEDURE — 73630 X-RAY EXAM OF FOOT: CPT

## 2021-09-28 PROCEDURE — 99024 POSTOP FOLLOW-UP VISIT: CPT | Performed by: PHYSICIAN ASSISTANT

## 2021-09-28 PROCEDURE — 99213 OFFICE O/P EST LOW 20 MIN: CPT | Performed by: PHYSICIAN ASSISTANT

## 2021-09-28 PROCEDURE — 29405 APPL SHORT LEG CAST: CPT | Performed by: PHYSICIAN ASSISTANT

## 2021-09-28 RX ORDER — ASPIRIN 81 MG/1
81 TABLET ORAL 2 TIMES DAILY
COMMUNITY
End: 2022-01-06 | Stop reason: ALTCHOICE

## 2021-09-28 RX ORDER — ERGOCALCIFEROL 1.25 MG/1
50000 CAPSULE ORAL WEEKLY
Qty: 12 CAPSULE | Refills: 1 | Status: SHIPPED | OUTPATIENT
Start: 2021-09-28

## 2021-09-28 RX ORDER — PHENOL 1.4 %
1 AEROSOL, SPRAY (ML) MUCOUS MEMBRANE DAILY
Qty: 30 TABLET | Refills: 3 | Status: SHIPPED | OUTPATIENT
Start: 2021-09-28

## 2021-09-28 RX ORDER — IBUPROFEN 200 MG
200 TABLET ORAL EVERY 6 HOURS PRN
COMMUNITY

## 2021-09-28 NOTE — PROGRESS NOTES
Chief Complaint   Patient presents with    Follow Up After Procedure     R foot lisfranc ORIF 09/15/21. ambulating with crutches. states splint cracked three days ago. pain controlled. OP:SURGEON: Dr. Roland Solis,   DATE OF PROCEDURE: 9/15/21  PROCEDURE:Right medial cuneiform fracture open reduction with internal fixation  Right Lisfranc tarsometatarsal joint open reduction with internal fixation  Right second tarsometatarsal joint open reduction with internal fixation    DATE OF PROCEDURE: 8/25/21  PROCEDURE:  1. Right foot first tarsometatarsal joint dislocation closed reduction percutaneous pinning  2. Right foot second tarsometatarsal joint dislocation closed reduction percutaneous pinning  3. Right foot fourth and fifth tarsometatarsal joint dislocations closed reduction with percutaneous pinning fifth TMT joint    POD: 2 weeks from most recent surgery, 4.5 weeks from initial CRPP    Subjective:  Nestor Nagel is following up from the above surgery. He is NWB on right lower extremity. He ambulates with assistive device, crutches. Pain to extremity is controlled and is  taking prescribed pain medication, Percocet and NSAIDs. They denies numbness, tingling, weakness to the right lower extremity. Denies calf pain, chest pain, or shortness of breath. Patient continues to use DVT prophylaxis, ASA 81 mg BID. Patient is not participating in therapy. Review of Systems -  All pertinent positives/negative in HPI     Objective:    General: Alert and oriented X 3, normocephalic atraumatic, external ears and eye normal, sclera clear, no acute distress, respirations easy and unlabored with no audible wheezes, skin warm and dry, speech and dress appropriate for noted age, affect euthymic.     Extremity:  Right Lower Extremity  Skin clean dry and intact, without signs of infection   Incision healing well, no significant drainage, no dehiscence, no significant erythema   Sutures in place and ready for removal  Percutaneous pin in place to the lateral aspect of the foot, no gross signs of loosening, no signs if infection  mild edema noted  Compartments supple throughout thigh and leg  Calf supple and not tender  negative Homans  Demonstrates active ankle DF/PF, wiggles all toes  States sensation intact to touch in sural, deep peroneal, superficial peroneal, saphenous, posterior tibial  nerve distributions to foot/ankle. Palpable dorsalis pedis and posterior tibialis pulses, cap refill brisk in toes, foot warm/perfused. Temp 98.5 °F (36.9 °C) (Oral)     XR:   3V of the right foot demonstrates screw fixation to the midfoot and lisfranc joint, perc pin in place from 5th TMT joint without signs of loosening. No interval change in alignment, no evidence of hardware loosening or failure    Assessment:   Diagnosis Orders   1. Lisfranc dislocation, right, subsequent encounter  vitamin D (ERGOCALCIFEROL) 1.25 MG (79009 UT) CAPS capsule    calcium carbonate (CALCIUM 600) 600 MG TABS tablet     PROCEDURE  Under aseptic conditions, 1 pins to the right foot were prepped using betadine and ethyl alcohol. Using traction, 1 pins were removed without difficulty. When Jerson Wires were removed demonstrated bleeding from pin tract. The appropriate dressing was placed and wound care was instructed after K-Wire removal in office. Patient tolerate procedure well without complication. Plan:  Remove sutures  Pin pulled    WB:  Non-weight bearing on right lower extremity    Please maintain Cast to surgical extremity, do not remove or alter without contacting office. Please keep clean and dry until next follow up in office. If this becomes too tight, too loose, wet or damaged please contact Ortho Trauma Office.      DVT: Continue with ASA 81 mg BID as ordered  Pain control : Call for refill when needed    Continue with ice to the injured extremity 2-3 times per day for swelling  If able continue with elevation and compression    Follow up in 4 weeks with XR of the right foot    Electronically signed by Deepika Penny PA-C on 9/28/2021 at 7:54 PM  Note: This report was completed using computerWebee voiced recognition software. Every effort has been made to ensure accuracy; however, inadvertent computerized transcription errors may be present.

## 2021-09-28 NOTE — PATIENT INSTRUCTIONS
Remove sutures  Pin pulled    WB:  Non-weight bearing on right lower extremity    Please maintain Cast to surgical extremity, do not remove or alter without contacting office. Please keep clean and dry until next follow up in office. If this becomes too tight, too loose, wet or damaged please contact Ortho Trauma Office. DVT: Continue with ASA 81 mg BID as ordered  Pain control : Call for refill when needed    Continue with ice to the injured extremity 2-3 times per day for swelling  If able continue with elevation and compression    Follow up in 4 weeks with XR of the right foot    ORTHOPEDIC TRAUMA TEAM STRONGLY RECOMMENDS THAT YOU STOP ALL NICOTINE USE  Nicotine severely impairs your body's ability to heal surgical and traumatic wounds but also impairs bone healing. Wounds and bone heal by forming microscopic blood vessels (angiogenesis) and nicotine is a vasoconstrictor (essentially, shrinks blood vessels). Therefore, if vasoconstriction occurs to these microscopic blood vessels they essentially disappear and are unable to deliver necessary nutrients to the healing tissue. Nicotine increases the chance of your bone not healing and possibly needing further surgery. Nicotine increases risk of infection. Nicotine causes poor wound healing and delayed wound healing. Quitting smoking or nicotine use is something that you can do to dramatically increase your chances of healing. Studies have shown patients who quit smoking have improved outcomes following orthopaedic surgery.     This includes all forms of nicotine (smoking, vaping, patches, chewing tobacco, etc.)

## 2021-10-25 ENCOUNTER — HOSPITAL ENCOUNTER (OUTPATIENT)
Dept: GENERAL RADIOLOGY | Age: 46
Discharge: HOME | End: 2021-10-27
Payer: COMMERCIAL

## 2021-10-25 ENCOUNTER — OFFICE VISIT (OUTPATIENT)
Dept: ORTHOPEDIC SURGERY | Age: 46
End: 2021-10-25
Payer: COMMERCIAL

## 2021-10-25 VITALS — TEMPERATURE: 98.2 F

## 2021-10-25 DIAGNOSIS — S93.324D LISFRANC DISLOCATION, RIGHT, SUBSEQUENT ENCOUNTER: ICD-10-CM

## 2021-10-25 DIAGNOSIS — S93.324D LISFRANC DISLOCATION, RIGHT, SUBSEQUENT ENCOUNTER: Primary | ICD-10-CM

## 2021-10-25 PROCEDURE — 99024 POSTOP FOLLOW-UP VISIT: CPT | Performed by: PHYSICIAN ASSISTANT

## 2021-10-25 PROCEDURE — 73630 X-RAY EXAM OF FOOT: CPT

## 2021-10-25 PROCEDURE — L4350 ANKLE CONTROL ORTHO PRE OTS: HCPCS | Performed by: PHYSICIAN ASSISTANT

## 2021-10-25 PROCEDURE — 99212 OFFICE O/P EST SF 10 MIN: CPT | Performed by: PHYSICIAN ASSISTANT

## 2021-10-25 NOTE — PROGRESS NOTES
Danielito Parr is a 39 y.o. male who presents for follow up of Right foot lisfranc ORIF     SURGEON: Dr. Ilene Collins,   Date of Injury/Surgery: 9/15/21  Date last seen in office: 9/15/21    Symptoms: better  New complaints: Feels better overall. Works as  - \"Want to go back to [facility], Brace, or Dressings: Duct tape on heel of cast, \"worn through\"    Weightbearing: right lower Non-weight bearing up until last week when \"started putting somse weight on it\"      Assistive device crutches  Participating in therapy (location if yes)?  no    Refills Needed: None  Order/Referral Needed: unsure

## 2021-10-25 NOTE — PATIENT INSTRUCTIONS
Continue touch down weightbearing on the right foot IN THE BOOT    Recommend the boot stay on at all times unless doing therapy- hygiene- icing- daily skin checks    Physical Therapy Orders written-- HOME CARE    Strongly recommend you use your walker when out in the field to your tree stand instead of your crutches    Continue with ice/elevation for any swelling control    Plan for follow up in 4-5 weeks for repeat evaluation and xrays with Dr. Claudine Workman

## 2021-10-31 NOTE — PROGRESS NOTES
Chief Complaint   Patient presents with    Follow Up After Procedure     post op Right foot lisfranc ORIF         OP:SURGEON: Dr. Alejandrina Bailey DO  DATE OF PROCEDURE: 9/15/21  PROCEDURE:Right medial cuneiform fracture open reduction with internal fixation  Right Lisfranc tarsometatarsal joint open reduction with internal fixation  Right second tarsometatarsal joint open reduction with internal fixation     DATE OF PROCEDURE: 8/25/21  PROCEDURE:  1.  Right foot first tarsometatarsal joint dislocation closed reduction percutaneous pinning  2.  Right foot second tarsometatarsal joint dislocation closed reduction percutaneous pinning  3.  Right foot fourth and fifth tarsometatarsal joint dislocations closed reduction with percutaneous pinning fifth TMT joint       POD: 6 weeks from ORIF    Subjective:  Cata High is following up from the above surgery. He is NWB on right lower extremity in office today, Does endorse putting weight on the RLE mostly through the heel. 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 He ambulates today with assistive device, crutches. iWalk prescription previously written. Pain to extremity is mild and is not taking prescribed pain medication, NSAIDs. They denies numbness, tingling, weakness to the right lower extremity. Endorses altered sensation after being in cast Denies calf pain, chest pain, or shortness of breath. Patient continues to use DVT prophylaxis, ASA 81 mg BID. Patient is not participating in therapy. There is cast breakdown at the heel today prior to removal.      Review of Systems -  All pertinent positives/negative in HPI     Objective:    General: Alert and oriented X 3, normocephalic atraumatic, external ears and eye normal, sclera clear, no acute distress, respirations easy and unlabored with no audible wheezes, skin warm and dry, speech and dress appropriate for noted age, affect euthymic.     Extremity:  Right Lower Extremity  Skin clean dry and intact, without signs of infection   Incision healing well, no significant drainage, no dehiscence, no significant erythema   Percutaneous pin site well healed   mild edema noted  Compartments supple throughout thigh and leg  Calf supple and not tender  negative Homans  Demonstrates active ankle DF/PF, wiggles all toes  No TTP throughout the foot on exam   States sensation intact to touch in sural, deep peroneal, superficial peroneal, saphenous, posterior tibial  nerve distributions to foot/ankle. Palpable dorsalis pedis and posterior tibialis pulses, cap refill brisk in toes, foot warm/perfused.       Temp 98.2 °F (36.8 °C) (Oral)     XR:   FINDINGS:   Surgical pin at the 5th metatarsal has been removed. Gregery Mar are surgical   screws bridging 1st and 2nd cuneiform is and the 2nd metatarsal unchanged in   position.  Joint spaces demonstrate no change in alignment.  No acute bony   lesions are seen.  No abnormal bony lucencies are identified.           Impression   Status post ORIF of Lisfranc dislocation in stable position             Assessment:   Diagnosis Orders   1. Lisfranc dislocation, right, subsequent encounter  [facility]:  Continue touch down weightbearing on the right foot IN THE BOOT-- recommended that patient not yet progress weightbearing through the foot until cleared to do so    Recommend the boot stay on at all times unless doing therapy- hygiene- icing- daily skin checks    Physical Therapy Orders written-- HOME CARE    Strongly recommend you use your walker when out in the field to your tree stand instead of your crutches    Continue with ice/elevation for any swelling control    Plan for follow up in 4-5 weeks for repeat evaluation and xrays with Dr. Lurdes Batista    Electronically signed by Jarvis Angeles PA-C on 10/31/2021 at 3:33 PM  Note: This report was completed using computerFriends Around voiced recognition software.   Every effort has been made to ensure accuracy; however,

## 2021-12-02 ENCOUNTER — OFFICE VISIT (OUTPATIENT)
Dept: ORTHOPEDIC SURGERY | Age: 46
End: 2021-12-02
Payer: COMMERCIAL

## 2021-12-02 ENCOUNTER — HOSPITAL ENCOUNTER (OUTPATIENT)
Dept: GENERAL RADIOLOGY | Age: 46
Discharge: HOME | End: 2021-12-04
Payer: COMMERCIAL

## 2021-12-02 VITALS — TEMPERATURE: 97.8 F

## 2021-12-02 DIAGNOSIS — S93.324D LISFRANC DISLOCATION, RIGHT, SUBSEQUENT ENCOUNTER: Primary | ICD-10-CM

## 2021-12-02 DIAGNOSIS — S93.324D LISFRANC DISLOCATION, RIGHT, SUBSEQUENT ENCOUNTER: ICD-10-CM

## 2021-12-02 PROCEDURE — 73630 X-RAY EXAM OF FOOT: CPT

## 2021-12-02 PROCEDURE — 99212 OFFICE O/P EST SF 10 MIN: CPT | Performed by: PHYSICIAN ASSISTANT

## 2021-12-02 PROCEDURE — 99024 POSTOP FOLLOW-UP VISIT: CPT | Performed by: PHYSICIAN ASSISTANT

## 2021-12-02 NOTE — PATIENT INSTRUCTIONS
OK to progress to weightbearing as tolerated on the R foot out of the boot  Recommend that you be fit for orthotics and into steel toed boots before release to return to work    [facility] Orthotist  [facility] with Ice/elevation, Ibuprofen/Tylenol as needed     Follow up in 4-6 weeks to discuss return to work

## 2021-12-02 NOTE — PROGRESS NOTES
Chief Complaint   Patient presents with    Follow-up     Pt expressed having only minor complaints. Pt expressed having soreness in the arch on Right Foot. Pt expressed having nothing else to complain at this time. OP:SURGEON: Dr. Nikia Phelan DO  DATE OF PROCEDURE: 9/15/21  PROCEDURE:Right medial cuneiform fracture open reduction with internal fixation  Right Lisfranc tarsometatarsal joint open reduction with internal fixation  Right second tarsometatarsal joint open reduction with internal fixation     DATE OF PROCEDURE: 8/25/21  PROCEDURE:  1.  Right foot first tarsometatarsal joint dislocation closed reduction percutaneous pinning  2.  Right foot second tarsometatarsal joint dislocation closed reduction percutaneous pinning  3.  Right foot fourth and fifth tarsometatarsal joint dislocations closed reduction with percutaneous pinning fifth TMT joint       POD: 12 weeks from ORIF    Subjective:  Juliana Modi is following up from the above surgery. He is WBAT on right lower extremity in CAM boot. States he has been coming out of the boot and doing PWB on R foot without a shoe on at home. Pain to extremity is mild and is not taking prescribed pain medication, NSAIDs PRN. Patient states that with weightbearing out of the boot he does not have increased pain. They denies numbness, tingling, weakness to the right lower extremity. Endorses discomfort over the medial arch. Denies calf pain, chest pain, or shortness of breath. Patient has finished DVT prophylaxis, ASA 81 mg BID. Patient is not participating in therapy     Review of Systems -  All pertinent positives/negative in HPI     Objective:    General: Alert and oriented X 3, normocephalic atraumatic, external ears and eye normal, sclera clear, no acute distress, respirations easy and unlabored with no audible wheezes, skin warm and dry, speech and dress appropriate for noted age, affect euthymic.     Extremity:  Right Lower Extremity  Skin clean dry and intact, without signs of infection   Incision healing well, no significant drainage, no dehiscence, no significant erythema   Percutaneous pin site well healed   mild edema noted  Compartments supple throughout thigh and leg  Calf supple and not tender  negative Homans  Demonstrates active ankle DF/PF, wiggles all toes  No TTP throughout the foot on exam   Mild TTP along medial plantar surface arch  States sensation intact to touch in sural, deep peroneal, superficial peroneal, saphenous, posterior tibial  nerve distributions to foot/ankle. Palpable dorsalis pedis and posterior tibialis pulses, cap refill brisk in toes, foot warm/perfused.       Temp 97.8 °F (36.6 °C) (Oral)     XR:  3V of the R foot: Status post ORIF of Lisfranc dislocation in stable position without any acute osseous abnormality or signs of hardware failure      Assessment:   Diagnosis Orders   1. Lisfranc dislocation, right, subsequent encounter  Amb External Referral For Orthotics       Plan:  Reviewed XR and exam with Dr Petey Jimenez today  OK to progress to weightbearing as tolerated on the R foot out of the boot  Recommend that you be fit for orthotics and into steel toed boots before release to return to work    [facility] with Ice/elevation, Ibuprofen/Tylenol as needed     Follow up in 4-6 weeks to discuss return to work    Electronically signed by Marija Pizano PA-C on 12/2/2021 at 11:16 AM  Note: This report was completed using ShotClip voiced recognition software. Every effort has been made to ensure accuracy; however, inadvertent computerized transcription errors may be present.

## 2021-12-02 NOTE — PROGRESS NOTES
Hai Wiseman is a 39 y.o. male who presents for follow up of 11 wk po check for Right Foot Lisfranc ORIF    SURGEON: Dr. Adilene Kim DO  Date of Injury/Surgery: 9-  Date last seen in office: 10-    Symptoms: better  New complaints: Pt has nothing to complain at this time. Weightbearing: right lower Partial weight bearing      Assistive device Walking Boot. Participating in therapy (location if yes)?  no    Refills Needed: None  Order/Referral Needed: N/A

## 2022-01-04 ENCOUNTER — TELEPHONE (OUTPATIENT)
Dept: ORTHOPEDIC SURGERY | Age: 47
End: 2022-01-04

## 2022-01-04 DIAGNOSIS — S93.324D LISFRANC DISLOCATION, RIGHT, SUBSEQUENT ENCOUNTER: Primary | ICD-10-CM

## 2022-01-06 ENCOUNTER — HOSPITAL ENCOUNTER (OUTPATIENT)
Dept: GENERAL RADIOLOGY | Age: 47
Discharge: HOME | End: 2022-01-08
Payer: COMMERCIAL

## 2022-01-06 ENCOUNTER — OFFICE VISIT (OUTPATIENT)
Dept: ORTHOPEDIC SURGERY | Age: 47
End: 2022-01-06
Payer: COMMERCIAL

## 2022-01-06 VITALS — TEMPERATURE: 98.4 F

## 2022-01-06 DIAGNOSIS — S93.324D LISFRANC DISLOCATION, RIGHT, SUBSEQUENT ENCOUNTER: Primary | ICD-10-CM

## 2022-01-06 DIAGNOSIS — S93.324D LISFRANC DISLOCATION, RIGHT, SUBSEQUENT ENCOUNTER: ICD-10-CM

## 2022-01-06 PROCEDURE — 99212 OFFICE O/P EST SF 10 MIN: CPT | Performed by: PHYSICIAN ASSISTANT

## 2022-01-06 PROCEDURE — 73630 X-RAY EXAM OF FOOT: CPT

## 2022-01-06 NOTE — PATIENT INSTRUCTIONS
OK to continue weightbearing as tolerated, activity as tolerated  Recommend you still get the orthotics from [facility] to return to work from orthopedic standpoint    Plan for follow up in 6-8 weeks, if having issues with hardware can discuss removal at that time    Santy Armenta  Phone: [phone]    Paulino

## 2022-01-06 NOTE — PROGRESS NOTES
Chief Complaint   Patient presents with    Follow-up     4mo f/u R lisfranc fx ORIF. Pain controlled, denies numbness or tingling, fever or chills. ROM good. WBAT without assistive device. Hasn't done therapy since november.  Other     XR in EPIC    Other     Needs to contact  for arch supports. 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 Agapito James,   DATE OF PROCEDURE: 9/15/21  PROCEDURE:Right medial cuneiform fracture open reduction with internal fixation  Right Lisfranc tarsometatarsal joint open reduction with internal fixation  Right second tarsometatarsal joint open reduction with internal fixation     DATE OF PROCEDURE: 8/25/21  PROCEDURE:  1.  Right foot first tarsometatarsal joint dislocation closed reduction percutaneous pinning  2.  Right foot second tarsometatarsal joint dislocation closed reduction percutaneous pinning  3.  Right foot fourth and fifth tarsometatarsal joint dislocations closed reduction with percutaneous pinning fifth TMT joint    POD: 16 weeks from ORIF    Subjective:  Veronica Luis is following up from the above surgery. He is WBAT on right lower extremity in a regular tennis shoe today. Pain to extremity is mild and is not taking prescribed pain medication, NSAIDs PRN. Patient states that with weightbearing out of the boot he does not have increased pain. They denies numbness, tingling, weakness to the right lower extremity. Endorses discomfort over the medial arch. Denies calf pain, chest pain, or shortness of breath. Patient has finished DVT prophylaxis, ASA 81 mg BID. Patient is not participating in therapy doing HEP.       Review of Systems -  All pertinent positives/negative in HPI     Objective:    General: Alert and oriented X 3, normocephalic atraumatic, external ears and eye normal, sclera clear, no acute distress, respirations easy and unlabored with no audible wheezes, skin warm and dry, speech and dress appropriate for noted age, affect euthymic. Extremity:  Right Lower Extremity  Skin clean dry and intact, without signs of infection   Incision healing well, no significant drainage, no dehiscence, no significant erythema   Percutaneous pin site well healed   mild edema noted  Compartments supple throughout thigh and leg  Calf supple and not tender  negative Homans  Demonstrates active ankle DF/PF, wiggles all toes  No TTP throughout the foot on exam   There is palpable prominent hardware to the midfoot. Mild TTP along medial plantar surface arch  States sensation intact to touch in sural, deep peroneal, superficial peroneal, saphenous, posterior tibial  nerve distributions to foot/ankle. Palpable dorsalis pedis and posterior tibialis pulses, cap refill brisk in toes, foot warm/perfused.       Temp 98.4 °F (36.9 °C)     XR:  3V of the R foot: Status post ORIF of Lisfranc dislocation in stable position without any acute osseous abnormality or signs of hardware failure      Assessment:   Diagnosis Orders   1. Lisfranc dislocation, right, subsequent encounter         Plan:  Patient will contact the office once his orthotics are obtained, will go back to work once orthotics fitted  Patient will contact office with fax number which to release to work letter  Patient may continue with his activity as tolerated, he is doing his own home exercise program and strengthening, this is appropriate and he is active enough to does need formal physical therapy  We discussed potential screw removal in the future    Electronically signed by Augusto Boo PA-C on 1/10/2022 at 6:54 PM  Note: This report was completed using computerDatahug voiced recognition software. Every effort has been made to ensure accuracy; however, inadvertent computerized transcription errors may be present.

## 2022-01-20 ENCOUNTER — TELEPHONE (OUTPATIENT)
Dept: ORTHOPEDIC SURGERY | Age: 47
End: 2022-01-20

## 2022-01-20 NOTE — TELEPHONE ENCOUNTER
Last we discussed he had wanted to wait to RTW until orthotics were completed, if this is the case we can provide a letter extending off work. If he would like to go back to work before February I can send him back when he feels he is comfortable doing so.    Electronically signed by Augusto Boo PA-C on 1/20/2022 at 1:43 PM

## 2022-01-20 NOTE — TELEPHONE ENCOUNTER
VM from pt requesting call back from Nadia FREY Re: RTW papers d/t  indicated Orthotic inserts will not be ready till February. Checking if that changes RTW status.

## 2022-01-20 NOTE — LETTER
[address]  [address]  [address] [facility] [address]  Phone: [phone]  Fax: [phone]    Lidya Schwarz PA-C        January 21, 2022     Patient: Bennie Guillermo   YOB: 1975   Date of Visit: 1/20/2022       To Whom It May Concern: It is my medical opinion that Bennie Guillermo will remain off of work until 2/11/22 due to awaiting durable medical equiment in order to return to work. May return to work on 2/12/22 without restrictions. If you have any questions or concerns, please don't hesitate to call.     Sincerely,        Lidya Schwarz PA-C

## 2022-01-20 NOTE — TELEPHONE ENCOUNTER
Call back to pt he needs an extended letter stating cannot go back till pt has inserts. Shipping on 2/3/2022.

## 2022-01-20 NOTE — TELEPHONE ENCOUNTER
Just confirm date needed on letter to RTW, possibly 2/7/22  Electronically signed by Augusto Boo PA-C on 1/20/2022 at 3:21 PM

## 2022-01-21 NOTE — TELEPHONE ENCOUNTER
Call back from pt. Needs new rtw letter extending till 2/11/22 along w/ov notes from last appt 1/6/2022 faxed to [phone] include cl# [id number]    Routing to provider to update RTW letter.

## 2022-01-21 NOTE — TELEPHONE ENCOUNTER
New letter printed and signed, at nurses station  Electronically signed by Barbara De La Garza PA-C on 1/21/2022 at 11:55 AM

## 2022-01-21 NOTE — TELEPHONE ENCOUNTER
Faxed RTW & Notes from [facility] 1/6/22 to Jacinta richardson @ [phone]

## 2022-02-09 ENCOUNTER — TELEPHONE (OUTPATIENT)
Dept: ORTHOPEDIC SURGERY | Age: 47
End: 2022-02-09

## 2022-02-09 NOTE — TELEPHONE ENCOUNTER
Pt called needs RTW letter ref cl # [id number]. Fitting for orthotics this Friday 2/11/22 can rtw on Monday 2/14/22.

## 2022-02-10 NOTE — TELEPHONE ENCOUNTER
Call back to pt advised RTW completed and faxed to [facility] @ [address]. RTW 2/14/22.

## 2022-02-24 DIAGNOSIS — S93.324D LISFRANC DISLOCATION, RIGHT, SUBSEQUENT ENCOUNTER: Primary | ICD-10-CM

## 2022-03-03 ENCOUNTER — TELEPHONE (OUTPATIENT)
Dept: ORTHOPEDIC SURGERY | Age: 47
End: 2022-03-03

## 2022-03-03 NOTE — TELEPHONE ENCOUNTER
Call from pt stating rec'd call re: upcoming appt on 3/4 @ 9am. Questioning what that is for. Call back to pt lvm advising appt is w/ [facility] for Rt foot. Advised if cannot keep to call office.

## 2022-03-11 ENCOUNTER — HOSPITAL ENCOUNTER (OUTPATIENT)
Dept: GENERAL RADIOLOGY | Age: 47
Discharge: HOME | End: 2022-03-13
Payer: COMMERCIAL

## 2022-03-11 ENCOUNTER — OFFICE VISIT (OUTPATIENT)
Dept: ORTHOPEDIC SURGERY | Age: 47
End: 2022-03-11
Payer: COMMERCIAL

## 2022-03-11 VITALS — TEMPERATURE: 98.5 F

## 2022-03-11 DIAGNOSIS — S93.324D LISFRANC DISLOCATION, RIGHT, SUBSEQUENT ENCOUNTER: ICD-10-CM

## 2022-03-11 DIAGNOSIS — S93.324D LISFRANC'S DISLOCATION, RIGHT, SUBSEQUENT ENCOUNTER: Primary | ICD-10-CM

## 2022-03-11 PROCEDURE — 73630 X-RAY EXAM OF FOOT: CPT

## 2022-03-11 PROCEDURE — 99213 OFFICE O/P EST LOW 20 MIN: CPT | Performed by: ORTHOPAEDIC SURGERY

## 2022-03-11 PROCEDURE — 99212 OFFICE O/P EST SF 10 MIN: CPT | Performed by: ORTHOPAEDIC SURGERY

## 2022-03-11 NOTE — PROGRESS NOTES
Chief Complaint   Patient presents with    Follow-up     6mo f/u R lisfranc. Pain controlled, denies numbness or tingling, fever or chills. Inquiring about getting screw out.  Other     XR in EPIC        OP:SURGEON: Dr. Dinorah Armstrong, DO  DATE OF PROCEDURE: 9/15/21  PROCEDURE:Right medial cuneiform fracture open reduction with internal fixation  Right Lisfranc tarsometatarsal joint open reduction with internal fixation  Right second tarsometatarsal joint open reduction with internal fixation     DATE OF PROCEDURE: 8/25/21  PROCEDURE:  1.  Right foot first tarsometatarsal joint dislocation closed reduction percutaneous pinning  2.  Right foot second tarsometatarsal joint dislocation closed reduction percutaneous pinning  3.  Right foot fourth and fifth tarsometatarsal joint dislocations closed reduction with percutaneous pinning fifth TMT joint    POD: 6 months from ORIF    Subjective:  Uma Arreola is following up from the above surgery. He has been weight bearing as tolerated on the RLE. States he is working 12 hour days with minimal discomfort. His only complaint is the screw on the dorsum of his foot rubs with some shoe wear. Is wanting to get the screw removed if possible. Still has soreness over the medial arch after a day of working. This is relieved by stretching and manual massage with foot roller. States overall he is feeling well. Review of Systems -  All pertinent positives/negative in HPI     Objective:    General: Alert and oriented X 3, normocephalic atraumatic, external ears and eye normal, sclera clear, no acute distress, respirations easy and unlabored with no audible wheezes, skin warm and dry, speech and dress appropriate for noted age, affect euthymic.     Extremity:  Right Lower Extremity  Skin clean dry and intact, without signs of infection  Incision well healed, no significant drainage, no dehiscence, no significant erythema   Percutaneous pin site well healed   Compartments supple throughout thigh and leg  Calf supple and not tender  negative Homans  Demonstrates active ankle DF/PF, wiggles all toes  No TTP throughout the foot on exam, irritation with palpation over dorsal screw head  There is palpable prominent hardware to the midfoot. Mild TTP along medial plantar surface arch  States sensation intact to touch in sural, deep peroneal, superficial peroneal, saphenous, posterior tibial  nerve distributions to foot/ankle. Palpable dorsalis pedis and posterior tibialis pulses, cap refill brisk in toes, foot warm/perfused.       Temp 98.5 °F (36.9 °C)     XR:  3V of the R foot: Status post ORIF of Lisfranc dislocation in stable position without any acute osseous abnormality or signs of hardware failure. Evidence of bony fusion. Assessment:  6 months s/p Lis franc ORIF  Plan:  Patient will continue WBAT RLE  States he will wait another 1-2 months to see if the hardware is still bothering him  CT scan ordered to confirm bony union  Patient will call office to schedule CT when ready for hardware removal  Call the clinic with any questions or concerns     Electronically signed by Vita Anderson DO on 3/11/2022 at 8:18 AM  Note: This report was completed using [employer]ewz voiced recognition software. Every effort has been made to ensure accuracy; however, inadvertent computerized transcription errors may be present. I have seen and evaluated the patient and agree with the above assessment on today's visit. I have performed the key components of the history and physical examination and concur completely with the findings and plans as documented. Agree with ROS, examination, FMH, PMH, PSH, SocHx, and allergies as above. Patient seen and examined. Patient doing well status post ORIF of right Lisfranc injury by Dr. Ramon Brown. Has minimal discomfort. He does have some pain over a prominence of the dorsum of his foot from a screw.   He is planning currently on potentially getting that out but wants to wait a couple months to see if it still bothers him. He will come back in for evaluation at that point time after he makes his decision. Otherwise he can be activity as tolerated. Past Medical History:   Diagnosis Date    Diverticulitis     Hypertension      Past Surgical History:   Procedure Laterality Date    ANKLE FRACTURE SURGERY Right 8/25/2021    RIGHT FOOT LISFRANC  PERCUTANEOUS PINNING performed by DO Adalid at [address] [place] [address] FOOT SURGERY Right 9/15/2021    RIGHT FOOT LISFRANC OPEN REDUCTION INTERNAL FIXATION---SYNTHES performed by DO Adalid at [facility] OR     No family history on file. Social History     Tobacco Use    Smoking status: Never Smoker    Smokeless tobacco: Current User     Types: Chew   Vaping Use    Vaping Use: Never used   Substance Use Topics    Alcohol use: Not Currently    Drug use: Never     No Known Allergies        Physical Examination:   General appearance: alert, well appearing, and in no distress,  normal appearing weight. No visible signs of trauma   Mental status: alert, oriented to person, place, and time, normal mood, behavior, speech, dress, motor activity, and thought processes  Abdomen: soft, nondistended  Resp:   resp easy and unlabored, no audible wheezes note, normal symmetrical expansion of both hemithoraces  Cardiac: distal pulses palpable, skin and extremities well perfused  Neurological: alert, oriented X3, normal speech, no focal findings or movement disorder noted, motor and sensory grossly normal bilaterally, normal muscle tone, no tremors  HEENT: normochephalic atraumatic, external ears and eyes normal, sclera normal, neck supple, no nasal discharge.    Extremities:   peripheral pulses normal, no edema, redness or tenderness in the calves   Skin: normal coloration, no rashes or open wounds, no suspicious skin lesions noted  Psych: Affect euthymic   Musculoskeletal:   Extremity:  Agree with above examination. No signs of infection. Neurovascularly intact        ELECTRONICALLY signed by:    Nick Harden MD  3/11/22   This is been dictated utilizing voice recognition software. All efforts have been made to make the note accurate although inadvertent errors may be present.

## 2022-03-11 NOTE — PATIENT INSTRUCTIONS
Weight bearing as tolerated Right lower extremity  CT scan ordered to confirm bony union  Call to schedule CT and follow up appointment  Call the clinic with any questions or concerns

## 2023-04-05 PROBLEM — E78.1 HYPERTRIGLYCERIDEMIA: Status: ACTIVE | Noted: 2023-04-05

## 2023-04-05 PROBLEM — K62.5 RECTAL BLEEDING: Status: ACTIVE | Noted: 2023-04-05

## 2023-04-05 PROBLEM — F41.9 ANXIETY: Status: ACTIVE | Noted: 2023-04-05

## 2023-04-05 PROBLEM — R73.03 PREDIABETES: Status: ACTIVE | Noted: 2023-04-05

## 2023-04-05 PROBLEM — E66.812 CLASS 2 SEVERE OBESITY DUE TO EXCESS CALORIES WITH SERIOUS COMORBIDITY AND BODY MASS INDEX (BMI) OF 37.0 TO 37.9 IN ADULT: Status: ACTIVE | Noted: 2023-04-05

## 2023-04-05 PROBLEM — R79.89 ELEVATED LFTS: Status: ACTIVE | Noted: 2023-04-05

## 2023-04-05 PROBLEM — K43.2 VENTRAL INCISIONAL HERNIA: Status: ACTIVE | Noted: 2023-04-05

## 2023-04-05 PROBLEM — R68.82 REDUCED LIBIDO: Status: ACTIVE | Noted: 2023-04-05

## 2023-04-05 PROBLEM — K64.4 EXTERNAL HEMORRHOIDS: Status: ACTIVE | Noted: 2023-04-05

## 2023-04-05 PROBLEM — F10.20 ALCOHOLISM (MULTI): Status: ACTIVE | Noted: 2023-04-05

## 2023-04-05 PROBLEM — E66.01 CLASS 2 SEVERE OBESITY DUE TO EXCESS CALORIES WITH SERIOUS COMORBIDITY AND BODY MASS INDEX (BMI) OF 37.0 TO 37.9 IN ADULT (MULTI): Status: ACTIVE | Noted: 2023-04-05

## 2023-04-05 PROBLEM — G47.00 INSOMNIA: Status: ACTIVE | Noted: 2023-04-05

## 2023-04-05 PROBLEM — I10 HYPERTENSION: Status: ACTIVE | Noted: 2023-04-05

## 2023-04-05 RX ORDER — HYDROXYZINE HYDROCHLORIDE 25 MG/1
1 TABLET, FILM COATED ORAL NIGHTLY
COMMUNITY
Start: 2021-10-29 | End: 2023-09-21 | Stop reason: SDUPTHER

## 2023-04-05 RX ORDER — HYDROCHLOROTHIAZIDE 25 MG/1
1 TABLET ORAL DAILY
COMMUNITY
Start: 2021-10-29 | End: 2023-08-02 | Stop reason: SDUPTHER

## 2023-04-05 RX ORDER — ENALAPRIL MALEATE 10 MG/1
1 TABLET ORAL 2 TIMES DAILY
COMMUNITY
Start: 2021-06-01 | End: 2023-10-05 | Stop reason: SDUPTHER

## 2023-04-05 RX ORDER — NALTREXONE HYDROCHLORIDE 50 MG/1
1 TABLET, FILM COATED ORAL DAILY
COMMUNITY
Start: 2022-09-26 | End: 2023-04-06 | Stop reason: SDUPTHER

## 2023-04-06 ENCOUNTER — OFFICE VISIT (OUTPATIENT)
Dept: PRIMARY CARE | Facility: CLINIC | Age: 48
End: 2023-04-06
Payer: COMMERCIAL

## 2023-04-06 VITALS
DIASTOLIC BLOOD PRESSURE: 80 MMHG | BODY MASS INDEX: 36.62 KG/M2 | OXYGEN SATURATION: 99 % | SYSTOLIC BLOOD PRESSURE: 124 MMHG | WEIGHT: 241.6 LBS | HEIGHT: 68 IN | HEART RATE: 92 BPM

## 2023-04-06 DIAGNOSIS — I10 PRIMARY HYPERTENSION: Primary | ICD-10-CM

## 2023-04-06 DIAGNOSIS — E66.01 CLASS 2 SEVERE OBESITY DUE TO EXCESS CALORIES WITH SERIOUS COMORBIDITY AND BODY MASS INDEX (BMI) OF 36.0 TO 36.9 IN ADULT (MULTI): ICD-10-CM

## 2023-04-06 DIAGNOSIS — F41.9 ANXIETY: ICD-10-CM

## 2023-04-06 DIAGNOSIS — Z23 IMMUNIZATION DUE: ICD-10-CM

## 2023-04-06 DIAGNOSIS — F10.20 ALCOHOLISM (MULTI): ICD-10-CM

## 2023-04-06 DIAGNOSIS — E78.1 HYPERTRIGLYCERIDEMIA: ICD-10-CM

## 2023-04-06 DIAGNOSIS — Z12.11 ENCOUNTER FOR SCREENING COLONOSCOPY: ICD-10-CM

## 2023-04-06 DIAGNOSIS — F51.02 ADJUSTMENT INSOMNIA: ICD-10-CM

## 2023-04-06 PROBLEM — S93.324A LISFRANC DISLOCATION, RIGHT, INITIAL ENCOUNTER: Status: RESOLVED | Noted: 2021-08-25 | Resolved: 2023-04-06

## 2023-04-06 PROCEDURE — 90715 TDAP VACCINE 7 YRS/> IM: CPT | Performed by: FAMILY MEDICINE

## 2023-04-06 PROCEDURE — 1036F TOBACCO NON-USER: CPT | Performed by: FAMILY MEDICINE

## 2023-04-06 PROCEDURE — 3074F SYST BP LT 130 MM HG: CPT | Performed by: FAMILY MEDICINE

## 2023-04-06 PROCEDURE — 90471 IMMUNIZATION ADMIN: CPT | Performed by: FAMILY MEDICINE

## 2023-04-06 PROCEDURE — 3008F BODY MASS INDEX DOCD: CPT | Performed by: FAMILY MEDICINE

## 2023-04-06 PROCEDURE — 90472 IMMUNIZATION ADMIN EACH ADD: CPT | Performed by: FAMILY MEDICINE

## 2023-04-06 PROCEDURE — 99214 OFFICE O/P EST MOD 30 MIN: CPT | Performed by: FAMILY MEDICINE

## 2023-04-06 PROCEDURE — 3079F DIAST BP 80-89 MM HG: CPT | Performed by: FAMILY MEDICINE

## 2023-04-06 RX ORDER — ESCITALOPRAM OXALATE 10 MG/1
10 TABLET ORAL DAILY
Qty: 90 TABLET | Refills: 3 | Status: SHIPPED | OUTPATIENT
Start: 2023-04-06 | End: 2023-12-15 | Stop reason: SDUPTHER

## 2023-04-06 RX ORDER — NALTREXONE HYDROCHLORIDE 50 MG/1
50 TABLET, FILM COATED ORAL DAILY
Qty: 90 TABLET | Refills: 1 | Status: SHIPPED | OUTPATIENT
Start: 2023-04-06 | End: 2023-10-03

## 2023-04-06 NOTE — PROGRESS NOTES
"Subjective   Patient ID: Will Givens is a 47 y.o. male who presents for Med Refill.  HPI  No SE meds  No CP, SOB, palpitations, numbness, weakness, dizziness, HA, vision changes  No alcohol in last 8 months    Mood is good  Sleeping well  No hopeless, worthless  Appetite is good  No SI/HI    Ophtho- last year  Dentist- 2 years ago  [place]-  Colonoscopy- ordered  YECENIA-  FOBT-  PSA-  UA/Micro-  Lung CT-  Coronary Calcium CT Score-  AAA-  EKG-  Pneumovax-  Prevnar-  Flu-  Shingrix-  Td- ordered  Hep C-  Advance Directives-      Current Outpatient Medications:     enalapril (Vasotec) 10 mg tablet, Take 1 tablet (10 mg) by mouth in the morning and 1 tablet (10 mg) before bedtime., Disp: , Rfl:     hydroCHLOROthiazide (HYDRODiuril) 25 mg tablet, Take 1 tablet (25 mg) by mouth once daily., Disp: , Rfl:     hydrOXYzine HCL (Atarax) 25 mg tablet, Take 1 tablet (25 mg) by mouth once daily at bedtime., Disp: , Rfl:     naltrexone (Depade) 50 mg tablet, Take 1 tablet (50 mg) by mouth once daily., Disp: , Rfl:    Past Surgical History:   Procedure Laterality Date    OTHER SURGICAL HISTORY  01/03/2022    Foot surgery    OTHER SURGICAL HISTORY  01/03/2022    Colon surgery      Past Medical History:   Diagnosis Date    Lisfranc dislocation, right, initial encounter 08/25/2021    Otitis media, unspecified, left ear 01/03/2022    Left acute otitis media    Personal history of urinary (tract) infections 04/06/2022    History of urinary tract infection    Unspecified otitis externa, left ear 01/03/2022    Left otitis externa     Social History     Tobacco Use    Smoking status: Never    Smokeless tobacco: Never   Substance Use Topics    Alcohol use: Never    Drug use: Never      Family History   Problem Relation Name Age of Onset    Hypertension Mother      Hypertension Father      Bone cancer Paternal Grandfather        Review of Systems    Objective   /80   Pulse 92   Ht 1.727 m (5' 8\")   Wt 110 kg (241 lb 9.6 oz)   SpO2 " 99%   BMI 36.74 kg/m²    Physical Exam  Vitals and nursing note reviewed.   Constitutional:       General: He is not in acute distress.     Appearance: Normal appearance.   HENT:      Head: Normocephalic and atraumatic.      Right Ear: Tympanic membrane, ear canal and external ear normal.      Left Ear: Tympanic membrane, ear canal and external ear normal.      Nose: Nose normal.      Mouth/Throat:      Mouth: Mucous membranes are moist.      Pharynx: Oropharynx is clear.   Eyes:      Extraocular Movements: Extraocular movements intact.      Pupils: Pupils are equal, round, and reactive to light.   Cardiovascular:      Rate and Rhythm: Normal rate and regular rhythm.      Pulses: Normal pulses.      Heart sounds: Normal heart sounds. No murmur heard.  Pulmonary:      Effort: Pulmonary effort is normal.      Breath sounds: Normal breath sounds.   Abdominal:      General: Abdomen is flat. Bowel sounds are normal.      Palpations: Abdomen is soft. There is no mass.   Musculoskeletal:      Cervical back: Normal range of motion and neck supple.   Lymphadenopathy:      Cervical: No cervical adenopathy.   Skin:     Capillary Refill: Capillary refill takes less than 2 seconds.   Neurological:      General: No focal deficit present.      Mental Status: He is alert and oriented to person, place, and time.   Psychiatric:         Mood and Affect: Mood normal.         Behavior: Behavior normal.         Assessment/Plan   Problem List Items Addressed This Visit       Alcoholism (CMS/[facility])    Hypertension - Primary    Hypertriglyceridemia    Insomnia    Class 2 severe obesity due to excess calories with serious comorbidity and body mass index (BMI) of 36.0 to 36.9 in adult (CMS/[facility])   Renewed/continued rest of medications  Checked labs  Updated Health Maintenance in HPI section  HTN, controlled- continue meds, low sodium diet     Obesity- caloric restriction, increase CV exercise     Hyperlipidemia- continue meds, low  fat/cholesterol diet     Insomnia- hydroxyzine, sleep hygiene     Alcoholism- avoid alcohol, naltrexone, Continue with program     F/U 6 months     Patient understands and agrees with treatment plan    Jr Angel, DO

## 2023-05-23 ENCOUNTER — HOSPITAL ENCOUNTER (OUTPATIENT)
Dept: DATA CONVERSION | Facility: HOSPITAL | Age: 48
End: 2023-05-23
Attending: INTERNAL MEDICINE | Admitting: INTERNAL MEDICINE
Payer: COMMERCIAL

## 2023-05-23 DIAGNOSIS — E78.5 HYPERLIPIDEMIA, UNSPECIFIED: ICD-10-CM

## 2023-05-23 DIAGNOSIS — Z12.11 ENCOUNTER FOR SCREENING FOR MALIGNANT NEOPLASM OF COLON: ICD-10-CM

## 2023-05-23 DIAGNOSIS — G47.33 OBSTRUCTIVE SLEEP APNEA (ADULT) (PEDIATRIC): ICD-10-CM

## 2023-05-23 DIAGNOSIS — K57.30 DIVERTICULOSIS OF LARGE INTESTINE WITHOUT PERFORATION OR ABSCESS WITHOUT BLEEDING: ICD-10-CM

## 2023-05-23 DIAGNOSIS — K63.5 POLYP OF COLON: ICD-10-CM

## 2023-05-23 DIAGNOSIS — R73.03 PREDIABETES: ICD-10-CM

## 2023-05-23 DIAGNOSIS — E66.9 OBESITY, UNSPECIFIED: ICD-10-CM

## 2023-05-23 DIAGNOSIS — Z90.49 ACQUIRED ABSENCE OF OTHER SPECIFIED PARTS OF DIGESTIVE TRACT: ICD-10-CM

## 2023-05-23 DIAGNOSIS — F41.9 ANXIETY DISORDER, UNSPECIFIED: ICD-10-CM

## 2023-05-23 DIAGNOSIS — I10 ESSENTIAL (PRIMARY) HYPERTENSION: ICD-10-CM

## 2023-06-01 LAB
COMPLETE PATHOLOGY REPORT: NORMAL
CONVERTED CLINICAL DIAGNOSIS-HISTORY: NORMAL
CONVERTED FINAL DIAGNOSIS: NORMAL
CONVERTED FINAL REPORT PDF LINK TO COPY AND PASTE: NORMAL
CONVERTED GROSS DESCRIPTION: NORMAL

## 2023-08-02 DIAGNOSIS — I10 PRIMARY HYPERTENSION: Primary | ICD-10-CM

## 2023-08-02 RX ORDER — HYDROCHLOROTHIAZIDE 25 MG/1
25 TABLET ORAL DAILY
Qty: 30 TABLET | Refills: 1 | Status: SHIPPED | OUTPATIENT
Start: 2023-08-02 | End: 2023-10-05 | Stop reason: SDUPTHER

## 2023-09-21 DIAGNOSIS — F41.9 ANXIETY: Primary | ICD-10-CM

## 2023-09-21 RX ORDER — HYDROXYZINE HYDROCHLORIDE 25 MG/1
25 TABLET, FILM COATED ORAL NIGHTLY
Qty: 60 TABLET | Refills: 2 | Status: SHIPPED | OUTPATIENT
Start: 2023-09-21 | End: 2023-12-15 | Stop reason: SDUPTHER

## 2023-10-05 DIAGNOSIS — I10 PRIMARY HYPERTENSION: ICD-10-CM

## 2023-10-05 RX ORDER — ENALAPRIL MALEATE 10 MG/1
10 TABLET ORAL 2 TIMES DAILY
Qty: 180 TABLET | Refills: 1 | Status: SHIPPED | OUTPATIENT
Start: 2023-10-05 | End: 2023-12-15 | Stop reason: SDUPTHER

## 2023-10-05 RX ORDER — HYDROCHLOROTHIAZIDE 25 MG/1
25 TABLET ORAL DAILY
Qty: 90 TABLET | Refills: 1 | Status: SHIPPED | OUTPATIENT
Start: 2023-10-05 | End: 2023-12-15 | Stop reason: SDUPTHER

## 2023-11-28 ENCOUNTER — TELEPHONE (OUTPATIENT)
Dept: PRIMARY CARE | Facility: CLINIC | Age: 48
End: 2023-11-28
Payer: COMMERCIAL

## 2023-11-28 DIAGNOSIS — F10.20 ALCOHOLISM (MULTI): Primary | ICD-10-CM

## 2023-11-28 RX ORDER — NALTREXONE HYDROCHLORIDE 50 MG/1
50 TABLET, FILM COATED ORAL DAILY
Qty: 90 TABLET | Refills: 3 | Status: SHIPPED | OUTPATIENT
Start: 2023-11-28 | End: 2023-12-15 | Stop reason: SDUPTHER

## 2023-12-15 ENCOUNTER — OFFICE VISIT (OUTPATIENT)
Dept: PRIMARY CARE | Facility: CLINIC | Age: 48
End: 2023-12-15
Payer: COMMERCIAL

## 2023-12-15 VITALS
DIASTOLIC BLOOD PRESSURE: 86 MMHG | SYSTOLIC BLOOD PRESSURE: 120 MMHG | WEIGHT: 246.6 LBS | BODY MASS INDEX: 37.5 KG/M2 | OXYGEN SATURATION: 98 % | HEART RATE: 82 BPM

## 2023-12-15 DIAGNOSIS — E78.1 HYPERTRIGLYCERIDEMIA: ICD-10-CM

## 2023-12-15 DIAGNOSIS — F10.20 ALCOHOLISM (MULTI): ICD-10-CM

## 2023-12-15 DIAGNOSIS — I10 PRIMARY HYPERTENSION: Primary | ICD-10-CM

## 2023-12-15 DIAGNOSIS — F41.1 GENERALIZED ANXIETY DISORDER: ICD-10-CM

## 2023-12-15 DIAGNOSIS — E66.01 CLASS 2 SEVERE OBESITY DUE TO EXCESS CALORIES WITH SERIOUS COMORBIDITY AND BODY MASS INDEX (BMI) OF 37.0 TO 37.9 IN ADULT (MULTI): ICD-10-CM

## 2023-12-15 LAB
ALBUMIN SERPL BCP-MCNC: 4.5 G/DL (ref 3.4–5)
ALP SERPL-CCNC: 77 U/L (ref 33–120)
ALT SERPL W P-5'-P-CCNC: 39 U/L (ref 10–52)
ANION GAP SERPL CALC-SCNC: 12 MMOL/L (ref 10–20)
AST SERPL W P-5'-P-CCNC: 25 U/L (ref 9–39)
BILIRUB SERPL-MCNC: 0.6 MG/DL (ref 0–1.2)
BUN SERPL-MCNC: 15 MG/DL (ref 6–23)
CALCIUM SERPL-MCNC: 9.8 MG/DL (ref 8.6–10.3)
CHLORIDE SERPL-SCNC: 98 MMOL/L (ref 98–107)
CHOLEST SERPL-MCNC: 144 MG/DL (ref 0–199)
CHOLESTEROL/HDL RATIO: 4.8
CO2 SERPL-SCNC: 30 MMOL/L (ref 21–32)
CREAT SERPL-MCNC: 1.2 MG/DL (ref 0.5–1.3)
GFR SERPL CREATININE-BSD FRML MDRD: 76 ML/MIN/1.73M*2
GLUCOSE SERPL-MCNC: 87 MG/DL (ref 74–99)
HDLC SERPL-MCNC: 30.2 MG/DL
LDLC SERPL CALC-MCNC: 40 MG/DL
NON HDL CHOLESTEROL: 114 MG/DL (ref 0–149)
POTASSIUM SERPL-SCNC: 4 MMOL/L (ref 3.5–5.3)
PROT SERPL-MCNC: 6.6 G/DL (ref 6.4–8.2)
SODIUM SERPL-SCNC: 136 MMOL/L (ref 136–145)
TRIGL SERPL-MCNC: 368 MG/DL (ref 0–149)
VLDL: 74 MG/DL (ref 0–40)

## 2023-12-15 PROCEDURE — 80061 LIPID PANEL: CPT

## 2023-12-15 PROCEDURE — 3074F SYST BP LT 130 MM HG: CPT | Performed by: FAMILY MEDICINE

## 2023-12-15 PROCEDURE — 99214 OFFICE O/P EST MOD 30 MIN: CPT | Performed by: FAMILY MEDICINE

## 2023-12-15 PROCEDURE — 80053 COMPREHEN METABOLIC PANEL: CPT

## 2023-12-15 PROCEDURE — 1036F TOBACCO NON-USER: CPT | Performed by: FAMILY MEDICINE

## 2023-12-15 PROCEDURE — 36415 COLL VENOUS BLD VENIPUNCTURE: CPT

## 2023-12-15 PROCEDURE — 3008F BODY MASS INDEX DOCD: CPT | Performed by: FAMILY MEDICINE

## 2023-12-15 PROCEDURE — 3079F DIAST BP 80-89 MM HG: CPT | Performed by: FAMILY MEDICINE

## 2023-12-15 RX ORDER — ENALAPRIL MALEATE 10 MG/1
10 TABLET ORAL 2 TIMES DAILY
Qty: 180 TABLET | Refills: 1 | Status: SHIPPED | OUTPATIENT
Start: 2023-12-15 | End: 2024-06-12

## 2023-12-15 RX ORDER — NALTREXONE HYDROCHLORIDE 50 MG/1
50 TABLET, FILM COATED ORAL DAILY
Qty: 90 TABLET | Refills: 3 | Status: SHIPPED | OUTPATIENT
Start: 2023-12-15 | End: 2024-12-14

## 2023-12-15 RX ORDER — ESCITALOPRAM OXALATE 10 MG/1
10 TABLET ORAL DAILY
Qty: 90 TABLET | Refills: 3 | Status: SHIPPED | OUTPATIENT
Start: 2023-12-15 | End: 2024-12-14

## 2023-12-15 RX ORDER — HYDROCHLOROTHIAZIDE 25 MG/1
25 TABLET ORAL DAILY
Qty: 90 TABLET | Refills: 1 | Status: SHIPPED | OUTPATIENT
Start: 2023-12-15 | End: 2024-04-10 | Stop reason: SDUPTHER

## 2023-12-15 RX ORDER — HYDROXYZINE HYDROCHLORIDE 25 MG/1
25 TABLET, FILM COATED ORAL NIGHTLY
Qty: 60 TABLET | Refills: 2 | Status: SHIPPED | OUTPATIENT
Start: 2023-12-15

## 2023-12-15 NOTE — PROGRESS NOTES
Subjective   Patient ID: Will Givens is a 47 y.o. male who presents for Follow-up.  HPI  No SE meds  No CP, SOB, palpitations, numbness, weakness, dizziness, HA, vision changes  No alcohol in last 18 months     Mood is good  Sleeping well  No hopeless, worthless  Appetite is good  No SI/HI     Ophtho- 12/23  Dentist- 2 years ago  [place]-  Colonoscopy- 5/23- follow up 5 years  YECENIA-  FOBT-  PSA-  UA/Micro-  Lung CT-  Coronary Calcium CT Score-  AAA-  EKG-  RSV-  Prevnar-  Flu-  Shingrix-  Td- 4/23  Hep C-  Advance Directives-    Current Outpatient Medications:     enalapril (Vasotec) 10 mg tablet, Take 1 tablet (10 mg) by mouth 2 times a day., Disp: 180 tablet, Rfl: 1    escitalopram (Lexapro) 10 mg tablet, Take 1 tablet (10 mg) by mouth once daily., Disp: 90 tablet, Rfl: 3    hydroCHLOROthiazide (HYDRODiuril) 25 mg tablet, Take 1 tablet (25 mg) by mouth once daily., Disp: 90 tablet, Rfl: 1    hydrOXYzine HCL (Atarax) 25 mg tablet, Take 1 tablet (25 mg) by mouth once daily at bedtime., Disp: 60 tablet, Rfl: 2    naltrexone (Depade) 50 mg tablet, Take 1 tablet (50 mg) by mouth once daily., Disp: 90 tablet, Rfl: 3   Past Surgical History:   Procedure Laterality Date    OTHER SURGICAL HISTORY  01/03/2022    Foot surgery    OTHER SURGICAL HISTORY  01/03/2022    Colon surgery      Past Medical History:   Diagnosis Date    Lisfranc dislocation, right, initial encounter 08/25/2021    Otitis media, unspecified, left ear 01/03/2022    Left acute otitis media    Personal history of urinary (tract) infections 04/06/2022    History of urinary tract infection    Unspecified otitis externa, left ear 01/03/2022    Left otitis externa     Social History     Tobacco Use    Smoking status: Never    Smokeless tobacco: Never   Substance Use Topics    Alcohol use: Never    Drug use: Never      Family History   Problem Relation Name Age of Onset    Hypertension Mother      Hypertension Father      Bone cancer Paternal Grandfather         Review of Systems    Objective   /86   Pulse 82   Wt 112 kg (246 lb 9.6 oz)   SpO2 98%   BMI 37.50 kg/m²    Physical Exam  Vitals and nursing note reviewed.   Constitutional:       General: He is not in acute distress.     Appearance: Normal appearance.   HENT:      Head: Normocephalic and atraumatic.      Right Ear: Tympanic membrane, ear canal and external ear normal.      Left Ear: Tympanic membrane, ear canal and external ear normal.      Nose: Nose normal.      Mouth/Throat:      Mouth: Mucous membranes are moist.      Pharynx: Oropharynx is clear.   Eyes:      Extraocular Movements: Extraocular movements intact.      Pupils: Pupils are equal, round, and reactive to light.   Cardiovascular:      Rate and Rhythm: Normal rate and regular rhythm.      Pulses: Normal pulses.      Heart sounds: Normal heart sounds. No murmur heard.  Pulmonary:      Effort: Pulmonary effort is normal.      Breath sounds: Normal breath sounds.   Abdominal:      General: Abdomen is flat. Bowel sounds are normal.      Palpations: Abdomen is soft. There is no mass.   Musculoskeletal:      Cervical back: Normal range of motion and neck supple.   Lymphadenopathy:      Cervical: No cervical adenopathy.   Skin:     Capillary Refill: Capillary refill takes less than 2 seconds.   Neurological:      General: No focal deficit present.      Mental Status: He is alert and oriented to person, place, and time.   Psychiatric:         Mood and Affect: Mood normal.         Behavior: Behavior normal.         Assessment/Plan   Problem List Items Addressed This Visit       Alcoholism (CMS/[facility])    Relevant Medications    naltrexone (Depade) 50 mg tablet    Class 2 severe obesity due to excess calories with serious comorbidity and body mass index (BMI) of 37.0 to 37.9 in adult (CMS/[facility])    Generalized anxiety disorder    Relevant Medications    escitalopram (Lexapro) 10 mg tablet    hydrOXYzine HCL (Atarax) 25 mg tablet    Hypertriglyceridemia     Relevant Orders    Lipid Panel (Completed)    Comprehensive Metabolic Panel (Completed)    Primary hypertension - Primary    Relevant Medications    enalapril (Vasotec) 10 mg tablet    hydroCHLOROthiazide (HYDRODiuril) 25 mg tablet    Other Relevant Orders    Comprehensive Metabolic Panel (Completed)   Renewed/continued rest of medications  Checked labs  Updated Health Maintenance in HPI section  HTN, controlled- continue meds, low sodium diet     Obesity- caloric restriction, increase CV exercise     Hyperlipidemia- continue meds, low fat/cholesterol diet     Insomnia- hydroxyzine, sleep hygiene     Alcoholism- avoid alcohol, naltrexone, Continue with program     F/U 6 months     Patient understands and agrees with treatment plan    Jr Angel, DO

## 2024-04-10 DIAGNOSIS — I10 PRIMARY HYPERTENSION: ICD-10-CM

## 2024-04-10 RX ORDER — HYDROCHLOROTHIAZIDE 25 MG/1
25 TABLET ORAL DAILY
Qty: 90 TABLET | Refills: 1 | Status: SHIPPED | OUTPATIENT
Start: 2024-04-10 | End: 2024-10-07

## 2024-06-04 ENCOUNTER — APPOINTMENT (OUTPATIENT)
Dept: PRIMARY CARE | Facility: CLINIC | Age: 49
End: 2024-06-04
Payer: COMMERCIAL

## 2024-06-24 DIAGNOSIS — F41.1 GENERALIZED ANXIETY DISORDER: ICD-10-CM

## 2024-06-24 RX ORDER — HYDROXYZINE HYDROCHLORIDE 25 MG/1
25 TABLET, FILM COATED ORAL NIGHTLY
Qty: 60 TABLET | Refills: 2 | Status: SHIPPED | OUTPATIENT
Start: 2024-06-24

## 2024-08-01 ENCOUNTER — APPOINTMENT (OUTPATIENT)
Dept: PRIMARY CARE | Facility: CLINIC | Age: 49
End: 2024-08-01
Payer: COMMERCIAL

## 2024-08-19 DIAGNOSIS — I10 PRIMARY HYPERTENSION: ICD-10-CM

## 2024-08-19 DIAGNOSIS — F10.20 ALCOHOLISM (MULTI): ICD-10-CM

## 2024-08-19 RX ORDER — ENALAPRIL MALEATE 10 MG/1
10 TABLET ORAL 2 TIMES DAILY
Qty: 180 TABLET | Refills: 1 | Status: SHIPPED | OUTPATIENT
Start: 2024-08-19 | End: 2025-02-15

## 2024-08-19 RX ORDER — NALTREXONE HYDROCHLORIDE 50 MG/1
50 TABLET, FILM COATED ORAL DAILY
Qty: 90 TABLET | Refills: 3 | Status: SHIPPED | OUTPATIENT
Start: 2024-08-19 | End: 2025-08-19

## 2024-08-19 RX ORDER — HYDROCHLOROTHIAZIDE 25 MG/1
25 TABLET ORAL DAILY
Qty: 90 TABLET | Refills: 1 | Status: SHIPPED | OUTPATIENT
Start: 2024-08-19 | End: 2025-02-15

## 2024-10-11 DIAGNOSIS — F41.1 GENERALIZED ANXIETY DISORDER: ICD-10-CM

## 2024-10-11 RX ORDER — HYDROXYZINE HYDROCHLORIDE 25 MG/1
25 TABLET, FILM COATED ORAL NIGHTLY
Qty: 60 TABLET | Refills: 2 | Status: SHIPPED | OUTPATIENT
Start: 2024-10-11

## 2025-01-24 DIAGNOSIS — F41.1 GENERALIZED ANXIETY DISORDER: ICD-10-CM

## 2025-01-24 DIAGNOSIS — I10 PRIMARY HYPERTENSION: ICD-10-CM

## 2025-01-24 RX ORDER — HYDROXYZINE HYDROCHLORIDE 25 MG/1
25 TABLET, FILM COATED ORAL NIGHTLY
Qty: 60 TABLET | Refills: 2 | Status: SHIPPED | OUTPATIENT
Start: 2025-01-24

## 2025-01-24 RX ORDER — ENALAPRIL MALEATE 10 MG/1
10 TABLET ORAL 2 TIMES DAILY
Qty: 180 TABLET | Refills: 1 | Status: SHIPPED | OUTPATIENT
Start: 2025-01-24 | End: 2025-07-23

## 2025-01-24 RX ORDER — ESCITALOPRAM OXALATE 10 MG/1
10 TABLET ORAL DAILY
Qty: 90 TABLET | Refills: 3 | Status: SHIPPED | OUTPATIENT
Start: 2025-01-24 | End: 2026-01-24

## 2025-01-27 DIAGNOSIS — F41.1 GENERALIZED ANXIETY DISORDER: ICD-10-CM

## 2025-01-27 RX ORDER — ESCITALOPRAM OXALATE 10 MG/1
10 TABLET ORAL DAILY
Qty: 90 TABLET | Refills: 0 | Status: SHIPPED | OUTPATIENT
Start: 2025-01-27 | End: 2025-04-27

## 2025-01-28 DIAGNOSIS — I10 PRIMARY HYPERTENSION: ICD-10-CM

## 2025-01-28 RX ORDER — HYDROCHLOROTHIAZIDE 25 MG/1
25 TABLET ORAL DAILY
Qty: 90 TABLET | Refills: 1 | Status: SHIPPED | OUTPATIENT
Start: 2025-01-28 | End: 2025-07-27

## 2025-02-17 DIAGNOSIS — F41.1 GENERALIZED ANXIETY DISORDER: ICD-10-CM

## 2025-02-17 RX ORDER — ESCITALOPRAM OXALATE 10 MG/1
10 TABLET ORAL DAILY
Qty: 90 TABLET | Refills: 0 | Status: SHIPPED | OUTPATIENT
Start: 2025-02-17 | End: 2025-05-18

## 2025-03-05 ENCOUNTER — APPOINTMENT (OUTPATIENT)
Dept: PRIMARY CARE | Facility: CLINIC | Age: 50
End: 2025-03-05
Payer: COMMERCIAL

## 2025-03-05 VITALS
OXYGEN SATURATION: 96 % | DIASTOLIC BLOOD PRESSURE: 90 MMHG | HEART RATE: 80 BPM | SYSTOLIC BLOOD PRESSURE: 128 MMHG | WEIGHT: 239 LBS | HEIGHT: 68 IN | BODY MASS INDEX: 36.22 KG/M2

## 2025-03-05 DIAGNOSIS — E66.01 CLASS 2 SEVERE OBESITY DUE TO EXCESS CALORIES WITH SERIOUS COMORBIDITY AND BODY MASS INDEX (BMI) OF 36.0 TO 36.9 IN ADULT: ICD-10-CM

## 2025-03-05 DIAGNOSIS — F10.21 ALCOHOLISM IN REMISSION (MULTI): ICD-10-CM

## 2025-03-05 DIAGNOSIS — E66.812 CLASS 2 SEVERE OBESITY DUE TO EXCESS CALORIES WITH SERIOUS COMORBIDITY AND BODY MASS INDEX (BMI) OF 36.0 TO 36.9 IN ADULT: ICD-10-CM

## 2025-03-05 DIAGNOSIS — F41.1 GENERALIZED ANXIETY DISORDER: ICD-10-CM

## 2025-03-05 DIAGNOSIS — I10 PRIMARY HYPERTENSION: ICD-10-CM

## 2025-03-05 DIAGNOSIS — E78.1 HYPERTRIGLYCERIDEMIA: ICD-10-CM

## 2025-03-05 DIAGNOSIS — Z00.00 WELL ADULT EXAM: Primary | ICD-10-CM

## 2025-03-05 DIAGNOSIS — R68.82 REDUCED LIBIDO: ICD-10-CM

## 2025-03-05 PROCEDURE — 3008F BODY MASS INDEX DOCD: CPT | Performed by: FAMILY MEDICINE

## 2025-03-05 PROCEDURE — 1036F TOBACCO NON-USER: CPT | Performed by: FAMILY MEDICINE

## 2025-03-05 PROCEDURE — 3080F DIAST BP >= 90 MM HG: CPT | Performed by: FAMILY MEDICINE

## 2025-03-05 PROCEDURE — 3074F SYST BP LT 130 MM HG: CPT | Performed by: FAMILY MEDICINE

## 2025-03-05 PROCEDURE — 99396 PREV VISIT EST AGE 40-64: CPT | Performed by: FAMILY MEDICINE

## 2025-03-05 RX ORDER — NALTREXONE HYDROCHLORIDE 50 MG/1
50 TABLET, FILM COATED ORAL DAILY
Qty: 90 TABLET | Refills: 3 | Status: SHIPPED | OUTPATIENT
Start: 2025-03-05 | End: 2026-03-05

## 2025-03-05 RX ORDER — HYDROCHLOROTHIAZIDE 25 MG/1
25 TABLET ORAL DAILY
Qty: 90 TABLET | Refills: 3 | Status: SHIPPED | OUTPATIENT
Start: 2025-03-05 | End: 2026-02-28

## 2025-03-05 RX ORDER — ESCITALOPRAM OXALATE 10 MG/1
10 TABLET ORAL DAILY
Qty: 90 TABLET | Refills: 3 | Status: SHIPPED | OUTPATIENT
Start: 2025-03-05 | End: 2026-02-28

## 2025-03-05 RX ORDER — HYDROXYZINE HYDROCHLORIDE 25 MG/1
25 TABLET, FILM COATED ORAL NIGHTLY
Qty: 90 TABLET | Refills: 3 | Status: SHIPPED | OUTPATIENT
Start: 2025-03-05

## 2025-03-05 RX ORDER — ENALAPRIL MALEATE 10 MG/1
10 TABLET ORAL 2 TIMES DAILY
Qty: 180 TABLET | Refills: 3 | Status: SHIPPED | OUTPATIENT
Start: 2025-03-05 | End: 2026-02-28

## 2025-03-05 ASSESSMENT — ENCOUNTER SYMPTOMS
NAUSEA: 0
HEMATURIA: 0
NERVOUS/ANXIOUS: 0
BRUISES/BLEEDS EASILY: 0
FREQUENCY: 0
FEVER: 0
HEADACHES: 0
DYSPHORIC MOOD: 0
SORE THROAT: 0
DIARRHEA: 0
CONSTIPATION: 0
BACK PAIN: 0
TROUBLE SWALLOWING: 0
ADENOPATHY: 0
POLYPHAGIA: 0
WHEEZING: 0
DYSURIA: 0
TREMORS: 0
ABDOMINAL PAIN: 0
COLOR CHANGE: 0
NUMBNESS: 0
EYE REDNESS: 0
COUGH: 0
WEAKNESS: 0
PALPITATIONS: 0
EYE PAIN: 0
BLOOD IN STOOL: 0
VOMITING: 0
CHEST TIGHTNESS: 0
ARTHRALGIAS: 0
DIZZINESS: 0
SHORTNESS OF BREATH: 0
CHILLS: 0
POLYDIPSIA: 0
FATIGUE: 0

## 2025-03-05 ASSESSMENT — PATIENT HEALTH QUESTIONNAIRE - PHQ9
SUM OF ALL RESPONSES TO PHQ9 QUESTIONS 1 AND 2: 0
2. FEELING DOWN, DEPRESSED OR HOPELESS: NOT AT ALL
1. LITTLE INTEREST OR PLEASURE IN DOING THINGS: NOT AT ALL

## 2025-03-05 NOTE — PROGRESS NOTES
Subjective   Patient ID: Will Givens is a 49 y.o. male who presents for Med Refill.  HPI  No SE meds  No CP, SOB, palpitations, numbness, weakness, dizziness, HA, vision changes  No alcohol in last 2.5 years     Mood is good  Sleeping well  No hopeless, worthless  Appetite is good  No SI/HI     Ophtho- 12/24  Dentist- UTD  [place]-  Colonoscopy- 5/23- follow up 5 years  YECENIA-  FOBT-  PSA-  UA/Micro-  Lung CT-  Coronary Calcium CT Score-  AAA-  EKG-  RSV-  Prevnar-  Flu-  Shingrix-  Td- 4/23  Hep C-  Advance Directives-  AWV- 3/5/25      Current Outpatient Medications:     enalapril (Vasotec) 10 mg tablet, Take 1 tablet (10 mg) by mouth 2 times a day., Disp: 180 tablet, Rfl: 3    escitalopram (Lexapro) 10 mg tablet, Take 1 tablet (10 mg) by mouth once daily., Disp: 90 tablet, Rfl: 3    hydroCHLOROthiazide (HYDRODiuril) 25 mg tablet, Take 1 tablet (25 mg) by mouth once daily., Disp: 90 tablet, Rfl: 3    hydrOXYzine HCL (Atarax) 25 mg tablet, Take 1 tablet (25 mg) by mouth once daily at bedtime., Disp: 90 tablet, Rfl: 3    naltrexone (Depade) 50 mg tablet, Take 1 tablet (50 mg) by mouth once daily., Disp: 90 tablet, Rfl: 3   Past Surgical History:   Procedure Laterality Date    OTHER SURGICAL HISTORY  01/03/2022    Foot surgery    OTHER SURGICAL HISTORY  01/03/2022    Colon surgery      Past Medical History:   Diagnosis Date    Lisfranc dislocation, right, initial encounter 08/25/2021    Otitis media, unspecified, left ear 01/03/2022    Left acute otitis media    Personal history of urinary (tract) infections 04/06/2022    History of urinary tract infection    Unspecified otitis externa, left ear 01/03/2022    Left otitis externa     Social History     Tobacco Use    Smoking status: Never    Smokeless tobacco: Former     Types: Chew   Substance Use Topics    Alcohol use: Never    Drug use: Never      Family History   Problem Relation Name Age of Onset    Hypertension Mother      Hypertension Father      Bone cancer  "Paternal Grandfather        Review of Systems   Constitutional:  Negative for chills, fatigue and fever.   HENT:  Negative for congestion, ear discharge, ear pain, hearing loss, nosebleeds, sore throat, tinnitus and trouble swallowing.    Eyes:  Negative for pain, redness and visual disturbance.   Respiratory:  Negative for cough, chest tightness, shortness of breath and wheezing.    Cardiovascular:  Negative for chest pain, palpitations and leg swelling.   Gastrointestinal:  Negative for abdominal pain, blood in stool, constipation, diarrhea, nausea and vomiting.   Endocrine: Negative for cold intolerance, heat intolerance, polydipsia, polyphagia and polyuria.   Genitourinary:  Negative for dysuria, frequency, hematuria and urgency.   Musculoskeletal:  Negative for arthralgias, back pain and gait problem.   Skin:  Negative for color change and rash.   Neurological:  Negative for dizziness, tremors, syncope, weakness, numbness and headaches.   Hematological:  Negative for adenopathy. Does not bruise/bleed easily.   Psychiatric/Behavioral:  Negative for dysphoric mood. The patient is not nervous/anxious.        Objective   /90   Pulse 80   Ht 1.727 m (5' 8\")   Wt 108 kg (239 lb)   SpO2 96%   BMI 36.34 kg/m²    Physical Exam  Vitals and nursing note reviewed.   Constitutional:       General: He is not in acute distress.     Appearance: Normal appearance.   HENT:      Head: Normocephalic and atraumatic.      Right Ear: Tympanic membrane, ear canal and external ear normal.      Left Ear: Tympanic membrane, ear canal and external ear normal.      Nose: Nose normal.      Mouth/Throat:      Mouth: Mucous membranes are moist.      Pharynx: Oropharynx is clear.   Eyes:      Extraocular Movements: Extraocular movements intact.      Pupils: Pupils are equal, round, and reactive to light.   Cardiovascular:      Rate and Rhythm: Normal rate and regular rhythm.      Pulses: Normal pulses.      Heart sounds: Normal " heart sounds. No murmur heard.  Pulmonary:      Effort: Pulmonary effort is normal.      Breath sounds: Normal breath sounds.   Abdominal:      General: Abdomen is flat. Bowel sounds are normal.      Palpations: Abdomen is soft. There is no mass.   Musculoskeletal:      Cervical back: Normal range of motion and neck supple.   Lymphadenopathy:      Cervical: No cervical adenopathy.   Skin:     Capillary Refill: Capillary refill takes less than 2 seconds.   Neurological:      General: No focal deficit present.      Mental Status: He is alert and oriented to person, place, and time.   Psychiatric:         Mood and Affect: Mood normal.         Behavior: Behavior normal.         Assessment/Plan   Problem List Items Addressed This Visit       Reduced libido    Relevant Orders    Testosterone    TSH with reflex to Free T4 if abnormal    Primary hypertension    Relevant Medications    enalapril (Vasotec) 10 mg tablet    hydroCHLOROthiazide (HYDRODiuril) 25 mg tablet    Other Relevant Orders    Comprehensive Metabolic Panel    CBC and Auto Differential    Hypertriglyceridemia    Relevant Orders    Lipid Panel    Generalized anxiety disorder    Relevant Medications    escitalopram (Lexapro) 10 mg tablet    hydrOXYzine HCL (Atarax) 25 mg tablet    Class 2 severe obesity due to excess calories with serious comorbidity and body mass index (BMI) of 36.0 to 36.9 in adult    Alcoholism in remission (Multi)    Relevant Medications    naltrexone (Depade) 50 mg tablet     Other Visit Diagnoses       Well adult exam    -  Primary        Renewed/continued rest of medications  Checked labs  Updated Health Maintenance in HPI section  HTN, controlled- continue meds, low sodium diet     Obesity- caloric restriction, increase CV exercise     Hyperlipidemia- continue meds, low fat/cholesterol diet     Insomnia- hydroxyzine, sleep hygiene     Alcoholism in remission- avoid alcohol, naltrexone, Continue with program     F/U 6 months     Patient  understands and agrees with treatment plan    Jr Angel, DO

## 2025-03-06 DIAGNOSIS — R79.89 LOW TESTOSTERONE IN MALE: ICD-10-CM

## 2025-03-06 DIAGNOSIS — R73.9 HYPERGLYCEMIA: Primary | ICD-10-CM

## 2025-03-06 LAB
ALBUMIN SERPL-MCNC: 4.7 G/DL (ref 3.6–5.1)
ALP SERPL-CCNC: 71 U/L (ref 36–130)
ALT SERPL-CCNC: 33 U/L (ref 9–46)
ANION GAP SERPL CALCULATED.4IONS-SCNC: 9 MMOL/L (CALC) (ref 7–17)
AST SERPL-CCNC: 23 U/L (ref 10–40)
BASOPHILS # BLD AUTO: 33 CELLS/UL (ref 0–200)
BASOPHILS NFR BLD AUTO: 0.5 %
BILIRUB SERPL-MCNC: 0.6 MG/DL (ref 0.2–1.2)
BUN SERPL-MCNC: 16 MG/DL (ref 7–25)
CALCIUM SERPL-MCNC: 9.4 MG/DL (ref 8.6–10.3)
CHLORIDE SERPL-SCNC: 103 MMOL/L (ref 98–110)
CHOLEST SERPL-MCNC: 145 MG/DL
CHOLEST/HDLC SERPL: 5 (CALC)
CO2 SERPL-SCNC: 24 MMOL/L (ref 20–32)
CREAT SERPL-MCNC: 0.92 MG/DL (ref 0.6–1.29)
EGFRCR SERPLBLD CKD-EPI 2021: 103 ML/MIN/1.73M2
EOSINOPHIL # BLD AUTO: 130 CELLS/UL (ref 15–500)
EOSINOPHIL NFR BLD AUTO: 2 %
ERYTHROCYTE [DISTWIDTH] IN BLOOD BY AUTOMATED COUNT: 13.3 % (ref 11–15)
GLUCOSE SERPL-MCNC: 142 MG/DL (ref 65–99)
HCT VFR BLD AUTO: 49.3 % (ref 38.5–50)
HDLC SERPL-MCNC: 29 MG/DL
HGB BLD-MCNC: 16.7 G/DL (ref 13.2–17.1)
LDLC SERPL CALC-MCNC: 78 MG/DL (CALC)
LYMPHOCYTES # BLD AUTO: 1697 CELLS/UL (ref 850–3900)
LYMPHOCYTES NFR BLD AUTO: 26.1 %
MCH RBC QN AUTO: 29.3 PG (ref 27–33)
MCHC RBC AUTO-ENTMCNC: 33.9 G/DL (ref 32–36)
MCV RBC AUTO: 86.6 FL (ref 80–100)
MONOCYTES # BLD AUTO: 559 CELLS/UL (ref 200–950)
MONOCYTES NFR BLD AUTO: 8.6 %
NEUTROPHILS # BLD AUTO: 4082 CELLS/UL (ref 1500–7800)
NEUTROPHILS NFR BLD AUTO: 62.8 %
NONHDLC SERPL-MCNC: 116 MG/DL (CALC)
PLATELET # BLD AUTO: 262 THOUSAND/UL (ref 140–400)
PMV BLD REES-ECKER: 10.4 FL (ref 7.5–12.5)
POTASSIUM SERPL-SCNC: 3.9 MMOL/L (ref 3.5–5.3)
PROT SERPL-MCNC: 6.9 G/DL (ref 6.1–8.1)
RBC # BLD AUTO: 5.69 MILLION/UL (ref 4.2–5.8)
SODIUM SERPL-SCNC: 136 MMOL/L (ref 135–146)
TESTOST SERPL-MCNC: 225 NG/DL (ref 250–827)
TRIGL SERPL-MCNC: 315 MG/DL
TSH SERPL-ACNC: 1.57 MIU/L (ref 0.4–4.5)
WBC # BLD AUTO: 6.5 THOUSAND/UL (ref 3.8–10.8)

## 2025-03-13 ENCOUNTER — APPOINTMENT (OUTPATIENT)
Dept: PRIMARY CARE | Facility: CLINIC | Age: 50
End: 2025-03-13
Payer: COMMERCIAL

## 2025-03-13 DIAGNOSIS — R79.89 LOW TESTOSTERONE IN MALE: ICD-10-CM

## 2025-03-13 DIAGNOSIS — R73.9 HYPERGLYCEMIA: ICD-10-CM

## 2025-03-13 LAB — POC HEMOGLOBIN A1C: 5.9 % (ref 4.2–6.5)

## 2025-03-13 PROCEDURE — 83036 HEMOGLOBIN GLYCOSYLATED A1C: CPT | Performed by: FAMILY MEDICINE

## 2025-03-14 LAB — TESTOST SERPL-MCNC: 235 NG/DL (ref 250–827)

## 2025-03-17 ENCOUNTER — TELEPHONE (OUTPATIENT)
Dept: PRIMARY CARE | Facility: CLINIC | Age: 50
End: 2025-03-17
Payer: COMMERCIAL

## 2025-03-17 DIAGNOSIS — E29.1 HYPOGONADISM IN MALE: Primary | ICD-10-CM

## 2025-03-17 DIAGNOSIS — R79.89 LOW TESTOSTERONE IN MALE: ICD-10-CM

## 2025-03-17 DIAGNOSIS — R73.9 HYPERGLYCEMIA: ICD-10-CM

## 2025-03-17 DIAGNOSIS — R73.9 HYPERGLYCEMIA: Primary | ICD-10-CM

## 2025-03-17 RX ORDER — TESTOSTERONE 20.25 MG/1.25G
20.25 GEL TOPICAL DAILY
Qty: 30 PACKET | Refills: 2 | Status: SHIPPED | OUTPATIENT
Start: 2025-03-17 | End: 2025-03-17 | Stop reason: SDUPTHER

## 2025-03-17 RX ORDER — TESTOSTERONE 20.25 MG/1.25G
20.25 GEL TOPICAL DAILY
Qty: 30 PACKET | Refills: 2 | Status: SHIPPED | OUTPATIENT
Start: 2025-03-17 | End: 2025-03-18 | Stop reason: SDUPTHER

## 2025-03-18 DIAGNOSIS — R73.9 HYPERGLYCEMIA: ICD-10-CM

## 2025-03-18 DIAGNOSIS — R79.89 LOW TESTOSTERONE IN MALE: ICD-10-CM

## 2025-03-18 DIAGNOSIS — E29.1 HYPOGONADISM MALE: Primary | ICD-10-CM

## 2025-03-18 RX ORDER — TESTOSTERONE 20.25 MG/1.25G
20.25 GEL TOPICAL DAILY
Qty: 30 PACKET | Refills: 2 | Status: SHIPPED | OUTPATIENT
Start: 2025-03-18 | End: 2025-03-19 | Stop reason: SDUPTHER

## 2025-03-19 ENCOUNTER — TELEPHONE (OUTPATIENT)
Dept: PRIMARY CARE | Facility: CLINIC | Age: 50
End: 2025-03-19
Payer: COMMERCIAL

## 2025-03-19 DIAGNOSIS — E29.1 HYPOGONADISM MALE: ICD-10-CM

## 2025-03-19 DIAGNOSIS — R79.89 LOW TESTOSTERONE IN MALE: ICD-10-CM

## 2025-03-19 RX ORDER — TESTOSTERONE 20.25 MG/1.25G
20.25 GEL TOPICAL DAILY
Qty: 30 PACKET | Refills: 2 | Status: SHIPPED | OUTPATIENT
Start: 2025-03-19

## 2025-03-19 NOTE — TELEPHONE ENCOUNTER
Please send his testosterone gel to [facility] in [place]. The PA was approved but [facility] will take too long.

## 2025-03-20 ENCOUNTER — TELEPHONE (OUTPATIENT)
Dept: PRIMARY CARE | Facility: CLINIC | Age: 50
End: 2025-03-20
Payer: COMMERCIAL

## 2025-03-20 NOTE — TELEPHONE ENCOUNTER
PT called and said [facility] is too expensive for his Testosterone can you send it to somewhere cheaper?

## 2025-03-21 DIAGNOSIS — E29.1 HYPOGONADISM IN MALE: ICD-10-CM

## 2025-03-21 DIAGNOSIS — E29.1 HYPOGONADISM IN MALE: Primary | ICD-10-CM

## 2025-03-21 RX ORDER — SYRINGE AND NEEDLE,INSULIN,1ML 28 GX5/16"
SYRINGE, EMPTY DISPOSABLE MISCELLANEOUS
Qty: 20 EACH | Refills: 3 | Status: SHIPPED | OUTPATIENT
Start: 2025-03-21

## 2025-03-21 RX ORDER — TESTOSTERONE CYPIONATE 200 MG/ML
200 INJECTION, SOLUTION INTRAMUSCULAR
Qty: 12 ML | Refills: 1 | Status: SHIPPED | OUTPATIENT
Start: 2025-03-21 | End: 2026-02-20

## (undated) DEVICE — IMPLANTABLE DEVICE
Type: IMPLANTABLE DEVICE | Status: NON-FUNCTIONAL
Removed: 2021-09-15

## (undated) DEVICE — BIT DRL L110MM DIA2.5MM ST G QUIK CPL NONRADIOPAQUE W/O STP

## (undated) DEVICE — GLOVE ORANGE PI 8   MSG9080

## (undated) DEVICE — SET ORTHO STD STORTSTD1

## (undated) DEVICE — SYRINGE MED 10ML TRNSLUC BRL PLUNG BLK MRK POLYPR CTRL

## (undated) DEVICE — TOTAL KNEE PK

## (undated) DEVICE — SURGICAL PROCEDURE PACK BASIC

## (undated) DEVICE — CLOTH SURG PREP PREOPERATIVE CHLORHEXIDINE GLUC 2% READYPREP

## (undated) DEVICE — BIT DRL L125MM DIA2MM S STL QUIK CPL FOR LOK COMPR PLT

## (undated) DEVICE — BIT DRL 3 FLUT 2.7X125 MM QC SS STRL LCP

## (undated) DEVICE — 3M™ STERI-DRAPE™ U-DRAPE 1015: Brand: STERI-DRAPE™

## (undated) DEVICE — TUBING SUCT 12FR MAL ALUM SHFT FN CAP VENT UNIV CONN W/ OBT

## (undated) DEVICE — ZIMMER® STERILE DISPOSABLE TOURNIQUET CUFF WITH PROTECTIVE SLEEVE AND PLC, DUAL PORT, SINGLE BLADDER, 34 IN. (86 CM)

## (undated) DEVICE — GLOVE ORTHO 8   MSG9480

## (undated) DEVICE — PADDING CAST W6INXL4YD COT LO LINTING WYTEX

## (undated) DEVICE — DRIP REDUCTION MANIFOLD

## (undated) DEVICE — SET ORTHO STD STORTSTD2

## (undated) DEVICE — GAUZE,SPONGE,AVANT,4"X4",4PLY,STRL,10/TR: Brand: MEDLINE

## (undated) DEVICE — PADDING,UNDERCAST,COTTON, 4"X4YD STERILE: Brand: MEDLINE

## (undated) DEVICE — ELECTRODE PT RET AD L9FT HI MOIST COND ADH HYDRGEL CORDED

## (undated) DEVICE — INTENDED FOR TISSUE SEPARATION, AND OTHER PROCEDURES THAT REQUIRE A SHARP SURGICAL BLADE TO PUNCTURE OR CUT.: Brand: BARD-PARKER ® STAINLESS STEEL BLADES

## (undated) DEVICE — DRESSING,GAUZE,XEROFORM,CURAD,5"X9",ST: Brand: CURAD

## (undated) DEVICE — BANDAGE COMPR W4INXL10YD WHITE/BEIGE E MTRX HK LOOP CLSR

## (undated) DEVICE — BIT DRL L160MM DIA2.7MM ST CANN QUIK CPL NONRADIOLUCENT ADJ

## (undated) DEVICE — ROLL COT W11.5INXL11FT 1LB HIGHLY ABSRB SURG GRD

## (undated) DEVICE — DRAPE EQUIP CARM 72X42 IN RUBBER BND CLP

## (undated) DEVICE — BANDAGE COMPR W6INXL12FT SMOOTH FOR LIMB EXSANG ESMARCH

## (undated) DEVICE — BIT DRL L170MM DIA3.2MM CANN QUIK CPL ADJ STP REUSE FOR

## (undated) DEVICE — DRAPE,REIN 53X77,STERILE: Brand: MEDLINE

## (undated) DEVICE — DRESSING,GAUZE,XEROFORM,CURAD,1"X8",ST: Brand: CURAD

## (undated) DEVICE — APPLICATOR MEDICATED 26 CC SOLUTION HI LT ORNG CHLORAPREP

## (undated) DEVICE — SINGLE USE DEVICE INTENDED TO COVER EXPOSED ENDS OF ORTHOPEDIC PIN AND K-WIRES TO HELP PROTECT THE EXPOSED WIRE FROM SNAGGING ON CLOTHING.: Brand: OXBORO™ PIN COVER

## (undated) DEVICE — SPLINT ORTH W5XL30IN PLSTR OF PARIS FAST IMMOB OF FRAC HI

## (undated) DEVICE — GUIDEWIRE ORTH L150MM DIA1.25MM S STL NTHRD FOR 4MM CANN

## (undated) DEVICE — SHEET,DRAPE,53X77,STERILE: Brand: MEDLINE

## (undated) DEVICE — GAUZE,SPONGE,4"X4",8PLY,STRL,LF,10/TRAY: Brand: MEDLINE

## (undated) DEVICE — GOWN,BREATHABLE SLV,AURORA,XLG,STRL: Brand: MEDLINE

## (undated) DEVICE — TOWEL,OR,DSP,ST,BLUE,STD,6/PK,12PK/CS: Brand: MEDLINE

## (undated) DEVICE — DRILL SYSTEM 7